# Patient Record
Sex: FEMALE | Race: WHITE | Employment: UNEMPLOYED | RURAL
[De-identification: names, ages, dates, MRNs, and addresses within clinical notes are randomized per-mention and may not be internally consistent; named-entity substitution may affect disease eponyms.]

---

## 2017-02-02 ENCOUNTER — LAB ONLY (OUTPATIENT)
Dept: FAMILY MEDICINE CLINIC | Age: 22
End: 2017-02-02

## 2017-02-02 DIAGNOSIS — Z02.89 EXAMINATION, PHYSICAL, EMPLOYEE: Primary | ICD-10-CM

## 2017-02-02 NOTE — MR AVS SNAPSHOT
Visit Information Date & Time Provider Department Dept. Phone Encounter #  
 2/2/2017  7:45 AM [facility] Allen [id number] Upcoming Health Maintenance Date Due Hepatitis A Peds Age 1-18 (1 of 2 - Standard Series) 12/13/1997 HPV AGE 9Y-26Y (1 of 3 - Female 3 Dose Series) 12/13/2007 DTaP/Tdap/Td series (6 - Tdap) 12/13/2007 INFLUENZA AGE 9 TO ADULT 8/1/2016 Allergies as of 2/2/2017  Review Complete On: 9/4/2014 By: Greg Gunter NP No Known Allergies Current Immunizations  Reviewed on 10/3/2014 Name Date DTaP 8/6/2002, 4/6/1998, 8/4/1997, 6/2/1997, 3/24/1997 Hep B Vaccine 6/2/1997, 1/14/1997, 1996 Hib 4/6/1998, 8/4/1997, 6/2/1997, 3/24/1997 MMR 8/6/2002, 4/6/1998 Poliovirus vaccine 8/6/2002, 8/4/1997, 6/2/1997, 3/4/1997 Varicella Virus Vaccine 1/12/1998 Not reviewed this visit You Were Diagnosed With   
  
 Codes Comments Examination, physical, employee    -  Primary ICD-10-CM: Z02.89 ICD-9-CM: V70.5 Vitals Smoking Status Never Smoker Preferred Pharmacy Pharmacy Name Phone [id number], [address] [phone] Your Updated Medication List  
  
   
This list is accurate as of: 2/2/17  9:32 AM.  Always use your most recent med list.  
  
  
  
  
 Elmer Paris FE 1/20 (28) 1 mg-20 mcg (21)/75 mg (7) Tab Generic drug:  norethindrone-ethinyl estradiol TAKE 1 TABLET BY MOUTH DAILY. OTHER We Performed the Following CBC WITH AUTOMATED DIFF [45176 CPT(R)] COLLECTION VENOUS BLOOD,VENIPUNCTURE [id number] CPT(R)] HEMOGLOBIN A1C WITH EAG [70707 CPT(R)] LIPID PANEL [14594 CPT(R)] Introducing [facility] & HEALTH SERVICES!    
 Ludivina Calabrese introduces [employer] Wellness patient portal. Now you can access parts of your medical record, email your doctor's office, and request medication refills online. 1. In your internet browser, go to [email or web address] 2. Click on the First Time User? Click Here link in the Sign In box. You will see the New Member Sign Up page. 3. Enter your Aframe Access Code exactly as it appears below. You will not need to use this code after youve completed the sign-up process. If you do not sign up before the expiration date, you must request a new code. · Aframe Access Code: [email or web address] Expires: 5/3/2017  9:32 AM 
 
4. Enter the last four digits of your Social Security Number (xxxx) and Date of Birth (mm/dd/yyyy) as indicated and click Submit. You will be taken to the next sign-up page. 5. Create a Aframe ID. This will be your Aframe login ID and cannot be changed, so think of one that is secure and easy to remember. 6. Create a Aframe password. You can change your password at any time. 7. Enter your Password Reset Question and Answer. This can be used at a later time if you forget your password. 8. Enter your e-mail address. You will receive e-mail notification when new information is available in [address] 9. Click Sign Up. You can now view and download portions of your medical record. 10. Click the Download Summary menu link to download a portable copy of your medical information. If you have questions, please visit the Frequently Asked Questions section of the Aframe website. Remember, Aframe is NOT to be used for urgent needs. For medical emergencies, dial 911. Now available from your iPhone and Android! Please provide this summary of care documentation to your next provider. Your primary care clinician is listed as Misa Morgan. If you have any questions after today's visit, please call [phone].

## 2017-02-03 LAB
BASOPHILS # BLD AUTO: 0 X10E3/UL (ref 0–0.2)
BASOPHILS NFR BLD AUTO: 0 %
CHOLEST SERPL-MCNC: 179 MG/DL (ref 100–199)
EOSINOPHIL # BLD AUTO: 0 X10E3/UL (ref 0–0.4)
EOSINOPHIL NFR BLD AUTO: 1 %
ERYTHROCYTE [DISTWIDTH] IN BLOOD BY AUTOMATED COUNT: 12.7 % (ref 12.3–15.4)
EST. AVERAGE GLUCOSE BLD GHB EST-MCNC: 117 MG/DL
HBA1C MFR BLD: 5.7 % (ref 4.8–5.6)
HCT VFR BLD AUTO: 40.6 % (ref 34–46.6)
HDLC SERPL-MCNC: 42 MG/DL
HGB BLD-MCNC: 13.9 G/DL (ref 11.1–15.9)
IMM GRANULOCYTES # BLD: 0 X10E3/UL (ref 0–0.1)
IMM GRANULOCYTES NFR BLD: 0 %
LDLC SERPL CALC-MCNC: 109 MG/DL (ref 0–99)
LYMPHOCYTES # BLD AUTO: 2 X10E3/UL (ref 0.7–3.1)
LYMPHOCYTES NFR BLD AUTO: 32 %
MCH RBC QN AUTO: 31.3 PG (ref 26.6–33)
MCHC RBC AUTO-ENTMCNC: 34.2 G/DL (ref 31.5–35.7)
MCV RBC AUTO: 91 FL (ref 79–97)
MONOCYTES # BLD AUTO: 0.5 X10E3/UL (ref 0.1–0.9)
MONOCYTES NFR BLD AUTO: 9 %
NEUTROPHILS # BLD AUTO: 3.7 X10E3/UL (ref 1.4–7)
NEUTROPHILS NFR BLD AUTO: 58 %
PLATELET # BLD AUTO: 291 X10E3/UL (ref 150–379)
RBC # BLD AUTO: 4.44 X10E6/UL (ref 3.77–5.28)
TRIGL SERPL-MCNC: 141 MG/DL (ref 0–149)
VLDLC SERPL CALC-MCNC: 28 MG/DL (ref 5–40)
WBC # BLD AUTO: 6.4 X10E3/UL (ref 3.4–10.8)

## 2018-03-13 ENCOUNTER — OFFICE VISIT (OUTPATIENT)
Dept: FAMILY MEDICINE CLINIC | Age: 23
End: 2018-03-13

## 2018-03-13 VITALS
DIASTOLIC BLOOD PRESSURE: 90 MMHG | WEIGHT: 201.2 LBS | SYSTOLIC BLOOD PRESSURE: 132 MMHG | BODY MASS INDEX: 33.52 KG/M2 | OXYGEN SATURATION: 99 % | HEART RATE: 90 BPM | RESPIRATION RATE: 20 BRPM | HEIGHT: 65 IN

## 2018-03-13 DIAGNOSIS — F41.1 GENERALIZED ANXIETY DISORDER: ICD-10-CM

## 2018-03-13 DIAGNOSIS — I10 ESSENTIAL HYPERTENSION: Primary | ICD-10-CM

## 2018-03-13 RX ORDER — METOPROLOL SUCCINATE 25 MG/1
25 TABLET, EXTENDED RELEASE ORAL DAILY
Qty: 30 TAB | Refills: 5 | Status: SHIPPED | OUTPATIENT
Start: 2018-03-13 | End: 2018-03-30 | Stop reason: SDUPTHER

## 2018-03-13 NOTE — PROGRESS NOTES
Chief Complaint   Patient presents with    Hypertension    Anxiety         HPI:       is a 25 y.o. female. 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 New Issues:  Her BP has been elevated the last couple times that she has been to the doctors office. Diastolic BP has been consistently in the 90s. Her heart rate is also high. Does not know if it is related to anxiety. She has gained weight, but has been working on changing her diet. No Known Allergies    Current Outpatient Prescriptions   Medication Sig    JUNEL FE 1/20, 28, 1 mg-20 mcg (21)/75 mg (7) tab TAKE 1 TABLET BY MOUTH DAILY. No current facility-administered medications for this visit. No past medical history on file. No past surgical history on file. Social History     Social History    Marital status:      Spouse name: N/A    Number of children: N/A    Years of education: N/A     Social History Main Topics    Smoking status: Never Smoker    Smokeless tobacco: Never Used    Alcohol use No    Drug use: None    Sexual activity: Yes     Partners: Male     Other Topics Concern    None     Social History Narrative       No family history on file. Above history reviewed. ROS:  Denies fever, chills, cough, chest pain, SOB,  nausea, vomiting, or diarrhea. Denies wt loss, wt gain, hemoptysis, hematochezia or melena. Physical Examination:    /90 (BP 1 Location: Right arm, BP Patient Position: Sitting)  Pulse 90  Resp 20  Ht 5' 4.5\" (1.638 m)  Wt 201 lb 3.2 oz (91.3 kg)  SpO2 99%  BMI 34 kg/m2    General: Alert and Ox3, Fluent speech  Neck:  Supple, no adenopathy, JVD, mass or bruit  Chest:  Clear to Ausculation, without wheezes, rales, rubs or ronchi  Cardiac: RRR  Extremities:  No cyanosis, clubbing or edema  Neurologic:  Ambulatory without assist, CN 2-12 grossly intact. Moves all extremities.   Skin: no rash  Lymphadenopathy: no cervical or supraclavicular nodes    ASSESSMENT AND PLAN:     1. Essential hypertension  Starting Metoprolol  May help to lower her heart rate as well  Plan to check labs one morning when she is fasting  - TSH 3RD GENERATION; Future  - METABOLIC PANEL, COMPREHENSIVE; Future  - CBC WITH AUTOMATED DIFF; Future  - metoprolol succinate (TOPROL-XL) 25 mg XL tablet; Take 1 Tab by mouth daily. Indications: hypertension  Dispense: 30 Tab; Refill: 5    2. Generalized anxiety disorder  Plan to start medication next visit  Would consider Effexor  Do not want to start two new medications at one time.        RTC in 2 weeks    Jenny Montano NP

## 2018-03-13 NOTE — PATIENT INSTRUCTIONS

## 2018-03-13 NOTE — MR AVS SNAPSHOT
[address] [facility] [phone] Patient: Demetrice Ashley MRN: [id number] :1995 Visit Information Date & Time Provider Department Dept. Phone Encounter #  
 3/13/2018  3:30 PM Ramon Leach NP Stephanie Bartholomew [id number] Upcoming Health Maintenance Date Due  
 HPV AGE 9Y-34Y (1 of 3 - Female 3 Dose Series) 2007 DTaP/Tdap/Td series (6 - Tdap) 2007 PAP AKA CERVICAL CYTOLOGY 2017 Allergies as of 3/13/2018  Review Complete On: 3/13/2018 By: Ramon Leach NP No Known Allergies Current Immunizations  Reviewed on 10/3/2014 Name Date DTaP 2002, 1998, 1997, 1997, 3/24/1997 Hep B Vaccine 1997, 1997, 1996 Hib 1998, 1997, 1997, 3/24/1997 MMR 2002, 1998 Poliovirus vaccine 2002, 1997, 1997, 3/4/1997 Varicella Virus Vaccine 1998 Not reviewed this visit You Were Diagnosed With   
  
 Codes Comments Essential hypertension    -  Primary ICD-10-CM: I10 
ICD-9-CM: 401.9 Generalized anxiety disorder     ICD-10-CM: F41.1 ICD-9-CM: 300.02 Vitals BP Pulse Resp Height(growth percentile) Weight(growth percentile) SpO2  
 132/90 (BP 1 Location: Right arm, BP Patient Position: Sitting) 90 20 5' 4.5\" (1.638 m) 201 lb 3.2 oz (91.3 kg) 99% BMI Smoking Status 34 kg/m2 Never Smoker Vitals History BMI and BSA Data Body Mass Index Body Surface Area 34 kg/m 2 2.04 m 2 Preferred Pharmacy Pharmacy Name Phone [id number], [address] [place] [address] [place] [phone] Your Updated Medication List  
  
   
This list is accurate as of 3/13/18  3:59 PM.  Always use your most recent med list.  
  
  
  
  
 Laverta Mendoza FE  (28) 1 mg-20 mcg (21)/75 mg (7) Tab Generic drug:  norethindrone-ethinyl estradiol TAKE 1 TABLET BY MOUTH DAILY. metoprolol succinate 25 mg XL tablet Commonly known as:  TOPROL-XL Take 1 Tab by mouth daily. Indications: hypertension Prescriptions Sent to Pharmacy Refills  
 metoprolol succinate (TOPROL-XL) 25 mg XL tablet 5 Sig: Take 1 Tab by mouth daily. Indications: hypertension Class: Normal  
 [id number] Laurine Olszewski, [address]  #: [phone] Route: Oral  
  
To-Do List   
 03/13/2018 Lab:  CBC WITH AUTOMATED DIFF   
  
 03/13/2018 Lab:  METABOLIC PANEL, COMPREHENSIVE   
  
 03/13/2018 Lab:  TSH 3RD GENERATION Introducing [facility] & HEALTH SERVICES! [facility] SYSTEM: Thank you for requesting a IP Fabrics account. Our records indicate that you already have an active IP [employer] account. You can access your account anytime at [email or web address] Did you know that you can access your hospital and ER discharge instructions at any time in IP Fab[employer]? You can also review all of your test results from your hospital stay or ER visit. Additional Information If you have questions, please visit the Frequently Asked Questions section of the IP Fabrics website at [email or web address] Remember, IP Fabrics is NOT to be used for urgent needs. For medical emergencies, dial 911. Now available from your iPhone and Android! Please provide this summary of care documentation to your next provider. Your primary care clinician is listed as Alvenia Saline. If you have any questions after today's visit, please call [phone].

## 2018-03-30 ENCOUNTER — OFFICE VISIT (OUTPATIENT)
Dept: FAMILY MEDICINE CLINIC | Age: 23
End: 2018-03-30

## 2018-03-30 VITALS
HEART RATE: 77 BPM | OXYGEN SATURATION: 98 % | SYSTOLIC BLOOD PRESSURE: 125 MMHG | DIASTOLIC BLOOD PRESSURE: 80 MMHG | WEIGHT: 196.4 LBS | BODY MASS INDEX: 32.72 KG/M2 | RESPIRATION RATE: 16 BRPM | HEIGHT: 65 IN

## 2018-03-30 DIAGNOSIS — I10 ESSENTIAL HYPERTENSION: Primary | ICD-10-CM

## 2018-03-30 DIAGNOSIS — F41.1 GENERALIZED ANXIETY DISORDER: ICD-10-CM

## 2018-03-30 RX ORDER — METOPROLOL SUCCINATE 25 MG/1
25 TABLET, EXTENDED RELEASE ORAL DAILY
Qty: 90 TAB | Refills: 3 | Status: SHIPPED | OUTPATIENT
Start: 2018-03-30 | End: 2019-06-27 | Stop reason: ALTCHOICE

## 2018-03-30 RX ORDER — VENLAFAXINE HYDROCHLORIDE 37.5 MG/1
37.5 CAPSULE, EXTENDED RELEASE ORAL DAILY
Qty: 30 CAP | Refills: 1 | Status: SHIPPED | OUTPATIENT
Start: 2018-03-30 | End: 2018-04-16 | Stop reason: SDUPTHER

## 2018-03-30 NOTE — MR AVS SNAPSHOT
[address] [facility] [phone] Patient: Fahad Bhatia MRN: [id number] :1995 Visit Information Date & Time Provider Department Dept. Phone Encounter #  
 3/30/2018  3:30 PM Delfino Mueller NP [address] [facility] [id number] Follow-up Instructions Return in about 2 weeks (around 2018). Follow-up and Disposition History Upcoming Health Maintenance Date Due  
 HPV AGE 9Y-34Y (1 of 3 - Female 3 Dose Series) 2007 DTaP/Tdap/Td series (6 - Tdap) 2007 PAP AKA CERVICAL CYTOLOGY 2017 Allergies as of 3/30/2018  Review Complete On: 3/30/2018 By: Delfino Mueller NP No Known Allergies Current Immunizations  Reviewed on 10/3/2014 Name Date DTaP 2002, 1998, 1997, 1997, 3/24/1997 Hep B Vaccine 1997, 1997, 1996 Hib 1998, 1997, 1997, 3/24/1997 MMR 2002, 1998 Poliovirus vaccine 2002, 1997, 1997, 3/4/1997 Varicella Virus Vaccine 1998 Not reviewed this visit You Were Diagnosed With   
  
 Codes Comments Essential hypertension    -  Primary ICD-10-CM: I10 
ICD-9-CM: 401.9 Generalized anxiety disorder     ICD-10-CM: F41.1 ICD-9-CM: 300.02 Vitals BP Pulse Resp Height(growth percentile) Weight(growth percentile) SpO2  
 125/80 (BP 1 Location: Left arm, BP Patient Position: Sitting) 77 16 5' 4.5\" (1.638 m) 196 lb 6.4 oz (89.1 kg) 98% BMI Smoking Status 33.19 kg/m2 Never Smoker Vitals History BMI and BSA Data Body Mass Index Body Surface Area  
 33.19 kg/m 2 2.01 m 2 Preferred Pharmacy [facility]acy Name Phone [id number], [address] [phone] Your Updated Medication List  
  
   
This list is accurate as of 3/30/18  4:01 PM.  Always use your most recent med list.  
  
  
  
  
 Brock Corea  1/20 (28) 1 mg-20 mcg (21)/75 mg (7) Tab Generic drug:  norethindrone-ethinyl estradiol TAKE 1 TABLET BY MOUTH DAILY. metoprolol succinate 25 mg XL tablet Commonly known as:  TOPROL-XL Take 1 Tab by mouth daily. Indications: hypertension  
  
 venlafaxine-SR 37.5 mg capsule Commonly known as:  EFFEXOR-XR Take 1 Cap by mouth daily. Indications: Generalized Anxiety Disorder Prescriptions Sent to Pharmacy Refills  
 venlafaxine-SR (EFFEXOR-XR) 37.5 mg capsule 1 Sig: Take 1 Cap by mouth daily. Indications: Generalized Anxiety Disorder Class: Normal  
 Pharmacy: [facility]/pharmacy #[id number] Jose Rosario [facility] [address] [place]  #: [phone] Route: Oral  
 metoprolol succinate (TOPROL-XL) 25 mg XL tablet 3 Sig: Take 1 Tab by mouth daily. Indications: hypertension Class: Normal  
 Pharmacy: [facility]/pharmacy #[id number] [place] [address] [facility] #: [phone] Route: Oral  
  
We Performed the Following COLLECTION VENOUS BLOOD,VENIPUNCTURE I4401020 CPT(R)] Follow-up Instructions Return in about 2 weeks (around 4/13/2018). Patient Instructions Anxiety Disorder: Care Instructions Your Care Instructions Anxiety is a normal reaction to stress. Difficult situations can cause you to have symptoms such as sweaty palms and a nervous feeling. In an anxiety disorder, the symptoms are far more severe. Constant worry, muscle tension, trouble sleeping, nausea and diarrhea, and other symptoms can make normal daily activities difficult or impossible. These symptoms may occur for no reason, and they can affect your work, school, or social life. Medicines, counseling, and self-care can all help. Follow-up care is a key part of your treatment and safety.  Be sure to make and go to all appointments, and call your doctor if you are having problems. It's also a good idea to know your test results and keep a list of the medicines you take. How can you care for yourself at home? · Take medicines exactly as directed. Call your doctor if you think you are having a problem with your medicine. · Go to your counseling sessions and follow-up appointments. · Recognize and accept your anxiety. Then, when you are in a situation that makes you anxious, say to yourself, \"This is not an emergency. I feel uncomfortable, but I am not in danger. I can keep going even if I feel anxious. \" · Be kind to your body: ¨ Relieve tension with exercise or a massage. ¨ Get enough rest. 
¨ Avoid alcohol, caffeine, nicotine, and illegal drugs. They can increase your anxiety level and cause sleep problems. ¨ Learn and do relaxation techniques. See below for more about these techniques. · Engage your mind. Get out and do something you enjoy. Go to a funny movie, or take a walk or hike. Plan your day. Having too much or too little to do can make you anxious. · Keep a record of your symptoms. Discuss your fears with a good friend or family member, or join a support group for people with similar problems. Talking to others sometimes relieves stress. · Get involved in social groups, or volunteer to help others. Being alone sometimes makes things seem worse than they are. · Get at least 30 minutes of exercise on most days of the week to relieve stress. Walking is a good choice. You also may want to do other activities, such as running, swimming, cycling, or playing tennis or team sports. Relaxation techniques Do relaxation exercises 10 to 20 minutes a day. You can play soothing, relaxing music while you do them, if you wish. · Tell others in your house that you are going to do your relaxation exercises. Ask them not to disturb you. · Find a comfortable place, away from all distractions and noise. · Lie down on your back, or sit with your back straight. · Focus on your breathing. Make it slow and steady. · Breathe in through your nose. Breathe out through either your nose or mouth. · Breathe deeply, filling up the area between your navel and your rib cage. Breathe so that your belly goes up and down. · Do not hold your breath. · Breathe like this for 5 to 10 minutes. Notice the feeling of calmness throughout your whole body. As you continue to breathe slowly and deeply, relax by doing the following for another 5 to 10 minutes: · Tighten and relax each muscle group in your body. You can begin at your toes and work your way up to your head. · Imagine your muscle groups relaxing and becoming heavy. · Empty your mind of all thoughts. · Let yourself relax more and more deeply. · Become aware of the state of calmness that surrounds you. · When your relaxation time is over, you can bring yourself back to alertness by moving your fingers and toes and then your hands and feet and then stretching and moving your entire body. Sometimes people fall asleep during relaxation, but they usually wake up shortly afterward. · Always give yourself time to return to full alertness before you drive a car or do anything that might cause an accident if you are not fully alert. Never play a relaxation tape while you drive a car. When should you call for help? Call 911 anytime you think you may need emergency care. For example, call if: 
? · You feel you cannot stop from hurting yourself or someone else. ? Keep the numbers for these national suicide hotlines: 3-814-222-TALK (8-634.390.5366) and 8-247-KYBANUE (8-648.231.4227). If you or someone you know talks about suicide or feeling hopeless, get help right away. ? Watch closely for changes in your health, and be sure to contact your doctor if: 
? · You have anxiety or fear that affects your life. ? · You have symptoms of anxiety that are new or different from those you had before. Where can you learn more? Go to http://james-mushtaq.info/. Enter P754 in the search box to learn more about \"Anxiety Disorder: Care Instructions. \" Current as of: May 12, 2017 Content Version: 11.4 © 8489-8543 [facility]. Care instructions adapted under license by BUYSTAND (which disclaims liability or warranty for this information). If you have questions about a medical condition or this instruction, always ask your healthcare professional. [address] any warranty or liability for your use of this information. If you have any questions regarding UBmatrix, you may call UBmatrix support at [phone]. Patient Instructions History Introducing [facility] & HEALTH SERVICES! Dear Jo Adamson: Thank you for requesting a Novafora account. Our records indicate that you already have an active Novafora account. You can access your account anytime at [email or web address] Did you know that you can access your hospital and ER discharge instructions at any time in Novafora? You can also review all of your test results from your hospital stay or ER visit. Additional Information If you have questions, please visit the Frequently Asked Questions section of the Novafora website at [email or web address] Remember, Novafora is NOT to be used for urgent needs. For medical emergencies, dial 911. Now available from your iPhone and Android! Please provide this summary of care documentation to your next provider. Your primary care clinician is listed as [facility]. If you have any questions after today's visit, please call [phone].

## 2018-03-30 NOTE — PATIENT INSTRUCTIONS
Anxiety Disorder: Care Instructions  Your Care Instructions    Anxiety is a normal reaction to stress. Difficult situations can cause you to have symptoms such as sweaty palms and a nervous feeling. In an anxiety disorder, the symptoms are far more severe. Constant worry, muscle tension, trouble sleeping, nausea and diarrhea, and other symptoms can make normal daily activities difficult or impossible. These symptoms may occur for no reason, and they can affect your work, school, or social life. Medicines, counseling, and self-care can all help. Follow-up care is a key part of your treatment and safety. Be sure to make and go to all appointments, and call your doctor if you are having problems. It's also a good idea to know your test results and keep a list of the medicines you take. How can you care for yourself at home? · Take medicines exactly as directed. Call your doctor if you think you are having a problem with your medicine. · Go to your counseling sessions and follow-up appointments. · Recognize and accept your anxiety. Then, when you are in a situation that makes you anxious, say to yourself, \"This is not an emergency. I feel uncomfortable, but I am not in danger. I can keep going even if I feel anxious. \"  · Be kind to your body:  ¨ Relieve tension with exercise or a massage. ¨ Get enough rest.  ¨ Avoid alcohol, caffeine, nicotine, and illegal drugs. They can increase your anxiety level and cause sleep problems. ¨ Learn and do relaxation techniques. See below for more about these techniques. · Engage your mind. Get out and do something you enjoy. Go to a funny movie, or take a walk or hike. Plan your day. Having too much or too little to do can make you anxious. · Keep a record of your symptoms. Discuss your fears with a good friend or family member, or join a support group for people with similar problems. Talking to others sometimes relieves stress.   · Get involved in social groups, or volunteer to help others. Being alone sometimes makes things seem worse than they are. · Get at least 30 minutes of exercise on most days of the week to relieve stress. Walking is a good choice. You also may want to do other activities, such as running, swimming, cycling, or playing tennis or team sports. Relaxation techniques  Do relaxation exercises 10 to 20 minutes a day. You can play soothing, relaxing music while you do them, if you wish. · Tell others in your house that you are going to do your relaxation exercises. Ask them not to disturb you. · Find a comfortable place, away from all distractions and noise. · Lie down on your back, or sit with your back straight. · Focus on your breathing. Make it slow and steady. · Breathe in through your nose. Breathe out through either your nose or mouth. · Breathe deeply, filling up the area between your navel and your rib cage. Breathe so that your belly goes up and down. · Do not hold your breath. · Breathe like this for 5 to 10 minutes. Notice the feeling of calmness throughout your whole body. As you continue to breathe slowly and deeply, relax by doing the following for another 5 to 10 minutes:  · Tighten and relax each muscle group in your body. You can begin at your toes and work your way up to your head. · Imagine your muscle groups relaxing and becoming heavy. · Empty your mind of all thoughts. · Let yourself relax more and more deeply. · Become aware of the state of calmness that surrounds you. · When your relaxation time is over, you can bring yourself back to alertness by moving your fingers and toes and then your hands and feet and then stretching and moving your entire body. Sometimes people fall asleep during relaxation, but they usually wake up shortly afterward. · Always give yourself time to return to full alertness before you drive a car or do anything that might cause an accident if you are not fully alert.  Never play a relaxation tape while you drive a car. When should you call for help? Call 911 anytime you think you may need emergency care. For example, call if:  ? · You feel you cannot stop from hurting yourself or someone else. ? Keep the numbers for these national suicide hotlines: 8-933-747-TALK (0-826.953.8900) and 0-918-TUMONVL (5-516.570.3153). If you or someone you know talks about suicide or feeling hopeless, get help right away. ? Watch closely for changes in your health, and be sure to contact your doctor if:  ? · You have anxiety or fear that affects your life. ? · You have symptoms of anxiety that are new or different from those you had before. Where can you learn more? Go to http://james-mushtaq.info/. Enter P754 in the search box to learn more about \"Anxiety Disorder: Care Instructions. \"  Current as of: May 12, 2017  Content Version: 11.4  © 0356-4649 eefoof.com. Care instructions adapted under license by Tabber (which disclaims liability or warranty for this information). If you have questions about a medical condition or this instruction, always ask your healthcare professional. [address] any warranty or liability for your use of this information. If you have any questions regarding ClaimKit, you may call ClaimKit support at [phone].

## 2018-03-30 NOTE — PROGRESS NOTES
Chief Complaint   Patient presents with    Anxiety     follow up    Hypertension         HPI:      Micah Mena is a 25 y.o. female. 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 Diastolic BP has been consistently in the 90s. Her heart rate is also high. Added Metoprolol last visit. Denies side effects. New Issues:  Doing well on BP pill. Did not get a chance to come back and get blood drawn. Still feels anxious. Is interested in starting on something. No Known Allergies    Current Outpatient Prescriptions   Medication Sig    metoprolol succinate (TOPROL-XL) 25 mg XL tablet Take 1 Tab by mouth daily. Indications: hypertension    JUNEL FE 1/20, 28, 1 mg-20 mcg (21)/75 mg (7) tab TAKE 1 TABLET BY MOUTH DAILY. No current facility-administered medications for this visit. No past medical history on file. No past surgical history on file. Social History     Social History    Marital status:      Spouse name: N/A    Number of children: N/A    Years of education: N/A     Social History Main Topics    Smoking status: Never Smoker    Smokeless tobacco: Never Used    Alcohol use No    Drug use: None    Sexual activity: Yes     Partners: Male     Other Topics Concern    None     Social History Narrative       No family history on file. Above history reviewed. ROS:  Denies fever, chills, cough, chest pain, SOB,  nausea, vomiting, or diarrhea. Denies wt loss, wt gain, hemoptysis, hematochezia or melena.     Physical Examination:    /80 (BP 1 Location: Left arm, BP Patient Position: Sitting)  Pulse 77  Resp 16  Ht 5' 4.5\" (1.638 m)  Wt 196 lb 6.4 oz (89.1 kg)  SpO2 98%  BMI 33.19 kg/m2    General: Alert and Ox3, Fluent speech  Neck:  Supple, no adenopathy, JVD, mass or bruit  Chest:  Clear to Ausculation, without wheezes, rales, rubs or yazmin  Cardiac: RRR  Extremities:  No cyanosis, clubbing or edema  Neurologic:  Ambulatory without assist, CN 2-12 grossly intact. Moves all extremities. Skin: no rash  Lymphadenopathy: no cervical or supraclavicular nodes    ASSESSMENT AND PLAN:     1. Essential hypertension  Good control  Continue current regimen   Checking labs  - metoprolol succinate (TOPROL-XL) 25 mg XL tablet; Take 1 Tab by mouth daily. Indications: hypertension  Dispense: 90 Tab; Refill: 3    2. Generalized anxiety disorder  Starting Effexor  Will likely need to increase in the future  - venlafaxine-SR (EFFEXOR-XR) 37.5 mg capsule; Take 1 Cap by mouth daily. Indications: Generalized Anxiety Disorder  Dispense: 30 Cap;  Refill: 1     RTC in 2 weeks    Suly Orellana NP

## 2018-03-31 LAB
ALBUMIN SERPL-MCNC: 4.5 G/DL (ref 3.5–5.5)
ALBUMIN/GLOB SERPL: 1.4 {RATIO} (ref 1.2–2.2)
ALP SERPL-CCNC: 61 IU/L (ref 39–117)
ALT SERPL-CCNC: 45 IU/L (ref 0–32)
AST SERPL-CCNC: 21 IU/L (ref 0–40)
BASOPHILS # BLD AUTO: 0 X10E3/UL (ref 0–0.2)
BASOPHILS NFR BLD AUTO: 0 %
BILIRUB SERPL-MCNC: 0.3 MG/DL (ref 0–1.2)
BUN SERPL-MCNC: 12 MG/DL (ref 6–20)
BUN/CREAT SERPL: 17 (ref 9–23)
CALCIUM SERPL-MCNC: 9.2 MG/DL (ref 8.7–10.2)
CHLORIDE SERPL-SCNC: 99 MMOL/L (ref 96–106)
CO2 SERPL-SCNC: 20 MMOL/L (ref 18–29)
CREAT SERPL-MCNC: 0.71 MG/DL (ref 0.57–1)
EOSINOPHIL # BLD AUTO: 0.1 X10E3/UL (ref 0–0.4)
EOSINOPHIL NFR BLD AUTO: 1 %
ERYTHROCYTE [DISTWIDTH] IN BLOOD BY AUTOMATED COUNT: 12.5 % (ref 12.3–15.4)
GFR SERPLBLD CREATININE-BSD FMLA CKD-EPI: 122 ML/MIN/1.73
GFR SERPLBLD CREATININE-BSD FMLA CKD-EPI: 141 ML/MIN/1.73
GLOBULIN SER CALC-MCNC: 3.2 G/DL (ref 1.5–4.5)
GLUCOSE SERPL-MCNC: 62 MG/DL (ref 65–99)
HCT VFR BLD AUTO: 40.2 % (ref 34–46.6)
HGB BLD-MCNC: 13.4 G/DL (ref 11.1–15.9)
IMM GRANULOCYTES # BLD: 0 X10E3/UL (ref 0–0.1)
IMM GRANULOCYTES NFR BLD: 0 %
LYMPHOCYTES # BLD AUTO: 2.7 X10E3/UL (ref 0.7–3.1)
LYMPHOCYTES NFR BLD AUTO: 39 %
MCH RBC QN AUTO: 31.1 PG (ref 26.6–33)
MCHC RBC AUTO-ENTMCNC: 33.3 G/DL (ref 31.5–35.7)
MCV RBC AUTO: 93 FL (ref 79–97)
MONOCYTES # BLD AUTO: 0.6 X10E3/UL (ref 0.1–0.9)
MONOCYTES NFR BLD AUTO: 9 %
NEUTROPHILS # BLD AUTO: 3.5 X10E3/UL (ref 1.4–7)
NEUTROPHILS NFR BLD AUTO: 51 %
PLATELET # BLD AUTO: 268 X10E3/UL (ref 150–379)
POTASSIUM SERPL-SCNC: 3.8 MMOL/L (ref 3.5–5.2)
PROT SERPL-MCNC: 7.7 G/DL (ref 6–8.5)
RBC # BLD AUTO: 4.31 X10E6/UL (ref 3.77–5.28)
SODIUM SERPL-SCNC: 139 MMOL/L (ref 134–144)
TSH SERPL DL<=0.005 MIU/L-ACNC: 2.18 UIU/ML (ref 0.45–4.5)
WBC # BLD AUTO: 6.9 X10E3/UL (ref 3.4–10.8)

## 2018-04-02 NOTE — PROGRESS NOTES
Thyroid level is normal.  Kidneys look good. One of your liver enzymes is slightly elevated. Avoid Tylenol and heavy amounts of alcohol. We will recheck in a few months.   Blood count is normal.

## 2018-04-16 ENCOUNTER — OFFICE VISIT (OUTPATIENT)
Dept: FAMILY MEDICINE CLINIC | Age: 23
End: 2018-04-16

## 2018-04-16 VITALS
SYSTOLIC BLOOD PRESSURE: 120 MMHG | OXYGEN SATURATION: 97 % | HEART RATE: 73 BPM | DIASTOLIC BLOOD PRESSURE: 72 MMHG | RESPIRATION RATE: 18 BRPM | BODY MASS INDEX: 32.39 KG/M2 | WEIGHT: 194.4 LBS | HEIGHT: 65 IN

## 2018-04-16 DIAGNOSIS — F41.1 GENERALIZED ANXIETY DISORDER: Primary | ICD-10-CM

## 2018-04-16 DIAGNOSIS — I10 ESSENTIAL HYPERTENSION: ICD-10-CM

## 2018-04-16 RX ORDER — VENLAFAXINE HYDROCHLORIDE 75 MG/1
75 CAPSULE, EXTENDED RELEASE ORAL DAILY
Qty: 30 CAP | Refills: 2 | Status: SHIPPED | OUTPATIENT
Start: 2018-04-16 | End: 2018-05-15 | Stop reason: SDUPTHER

## 2018-04-16 NOTE — PROGRESS NOTES
Chief Complaint   Patient presents with    Hypertension     2 week follow up         HPI:      Stormy Yu is a 25 y.o. female. 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 Diastolic BP has been consistently in the 90s. Her heart rate is also high. Added Metoprolol last visit. Denies side effects. Anxiety:  Started on Effexor last visit. Tolerating well. New Issues:  She is here for a BP recheck and check on her anti-anxiety medication. She is not sure how much it helping. Does not feel much different. No Known Allergies    Current Outpatient Prescriptions   Medication Sig    venlafaxine-SR (EFFEXOR-XR) 37.5 mg capsule Take 1 Cap by mouth daily. Indications: Generalized Anxiety Disorder    metoprolol succinate (TOPROL-XL) 25 mg XL tablet Take 1 Tab by mouth daily. Indications: hypertension    JUNEL FE 1/20, 28, 1 mg-20 mcg (21)/75 mg (7) tab TAKE 1 TABLET BY MOUTH DAILY. No current facility-administered medications for this visit. No past medical history on file. No past surgical history on file. Social History     Social History    Marital status:      Spouse name: N/A    Number of children: N/A    Years of education: N/A     Social History Main Topics    Smoking status: Never Smoker    Smokeless tobacco: Never Used    Alcohol use No    Drug use: None    Sexual activity: Yes     Partners: Male     Other Topics Concern    None     Social History Narrative       No family history on file. Above history reviewed. ROS:  Denies fever, chills, cough, chest pain, SOB,  nausea, vomiting, or diarrhea. Denies wt loss, wt gain, hemoptysis, hematochezia or melena.     Physical Examination:    /72 (BP 1 Location: Left arm, BP Patient Position: Sitting)  Pulse 73  Resp 18  Ht 5' 4.5\" (1.638 m)  Wt 194 lb 6.4 oz (88.2 kg)  SpO2 97% BMI 32.85 kg/m2    General: Alert and Ox3, Fluent speech  Neck:  Supple, no adenopathy, JVD, mass or bruit  Chest:  Clear to Ausculation, without wheezes, rales, rubs or ronchi  Cardiac: RRR  Extremities:  No cyanosis, clubbing or edema  Neurologic:  Ambulatory without assist, CN 2-12 grossly intact. Moves all extremities. Skin: no rash  Lymphadenopathy: no cervical or supraclavicular nodes    ASSESSMENT AND PLAN:     1. Generalized anxiety disorder  Increasing dose  - venlafaxine-SR (EFFEXOR-XR) 75 mg capsule; Take 1 Cap by mouth daily. Indications: Generalized Anxiety Disorder  Dispense: 30 Cap; Refill: 2    2.  Essential hypertension  Good control  Continue current regimen      RTC in 1 month    Yenny Davies NP

## 2018-04-16 NOTE — PATIENT INSTRUCTIONS
Anxiety Disorder: Care Instructions  Your Care Instructions    Anxiety is a normal reaction to stress. Difficult situations can cause you to have symptoms such as sweaty palms and a nervous feeling. In an anxiety disorder, the symptoms are far more severe. Constant worry, muscle tension, trouble sleeping, nausea and diarrhea, and other symptoms can make normal daily activities difficult or impossible. These symptoms may occur for no reason, and they can affect your work, school, or social life. Medicines, counseling, and self-care can all help. Follow-up care is a key part of your treatment and safety. Be sure to make and go to all appointments, and call your doctor if you are having problems. It's also a good idea to know your test results and keep a list of the medicines you take. How can you care for yourself at home? · Take medicines exactly as directed. Call your doctor if you think you are having a problem with your medicine. · Go to your counseling sessions and follow-up appointments. · Recognize and accept your anxiety. Then, when you are in a situation that makes you anxious, say to yourself, \"This is not an emergency. I feel uncomfortable, but I am not in danger. I can keep going even if I feel anxious. \"  · Be kind to your body:  ¨ Relieve tension with exercise or a massage. ¨ Get enough rest.  ¨ Avoid alcohol, caffeine, nicotine, and illegal drugs. They can increase your anxiety level and cause sleep problems. ¨ Learn and do relaxation techniques. See below for more about these techniques. · Engage your mind. Get out and do something you enjoy. Go to a funny movie, or take a walk or hike. Plan your day. Having too much or too little to do can make you anxious. · Keep a record of your symptoms. Discuss your fears with a good friend or family member, or join a support group for people with similar problems. Talking to others sometimes relieves stress.   · Get involved in social groups, or volunteer to help others. Being alone sometimes makes things seem worse than they are. · Get at least 30 minutes of exercise on most days of the week to relieve stress. Walking is a good choice. You also may want to do other activities, such as running, swimming, cycling, or playing tennis or team sports. Relaxation techniques  Do relaxation exercises 10 to 20 minutes a day. You can play soothing, relaxing music while you do them, if you wish. · Tell others in your house that you are going to do your relaxation exercises. Ask them not to disturb you. · Find a comfortable place, away from all distractions and noise. · Lie down on your back, or sit with your back straight. · Focus on your breathing. Make it slow and steady. · Breathe in through your nose. Breathe out through either your nose or mouth. · Breathe deeply, filling up the area between your navel and your rib cage. Breathe so that your belly goes up and down. · Do not hold your breath. · Breathe like this for 5 to 10 minutes. Notice the feeling of calmness throughout your whole body. As you continue to breathe slowly and deeply, relax by doing the following for another 5 to 10 minutes:  · Tighten and relax each muscle group in your body. You can begin at your toes and work your way up to your head. · Imagine your muscle groups relaxing and becoming heavy. · Empty your mind of all thoughts. · Let yourself relax more and more deeply. · Become aware of the state of calmness that surrounds you. · When your relaxation time is over, you can bring yourself back to alertness by moving your fingers and toes and then your hands and feet and then stretching and moving your entire body. Sometimes people fall asleep during relaxation, but they usually wake up shortly afterward. · Always give yourself time to return to full alertness before you drive a car or do anything that might cause an accident if you are not fully alert.  Never play a relaxation tape while you drive a car. When should you call for help? Call 911 anytime you think you may need emergency care. For example, call if:  ? · You feel you cannot stop from hurting yourself or someone else. ? Keep the numbers for these national suicide hotlines: 6-126-195-TALK (3-145.958.6708) and 9-206-AQEJTAY (8-770.905.3608). If you or someone you know talks about suicide or feeling hopeless, get help right away. ? Watch closely for changes in your health, and be sure to contact your doctor if:  ? · You have anxiety or fear that affects your life. ? · You have symptoms of anxiety that are new or different from those you had before. Where can you learn more? Go to http://james-mushtaq.info/. Enter P754 in the search box to learn more about \"Anxiety Disorder: Care Instructions. \"  Current as of: May 12, 2017  Content Version: 11.4  © 6679-4991 Healthwise, [employer]. Care instructions adapted under license by [employer] (which disclaims liability or warranty for this information). If you have questions about a medical condition or this instruction, always ask your healthcare professional. [address] any warranty or liability for your use of this information.

## 2018-04-16 NOTE — MR AVS SNAPSHOT
[address] [facility] [phone] Patient: Louie Gallardo MRN: [id number] :1995 Visit Information Date & Time Provider Department Dept. Phone Encounter #  
 2018  7:30 AM Isaias Moise NP Stephanie Bartholomew [id number] Upcoming Health Maintenance Date Due  
 HPV Age 9Y-34Y (1 of 1 - Female 3 Dose Series) 2007 DTaP/Tdap/Td series (6 - Tdap) 2007 PAP AKA CERVICAL CYTOLOGY 2017 Allergies as of 2018  Review Complete On: 2018 By: Isaias Moise NP No Known Allergies Current Immunizations  Reviewed on 10/3/2014 Name Date DTaP 2002, 1998, 1997, 1997, 3/24/1997 Hep B Vaccine 1997, 1997, 1996 Hib 1998, 1997, 1997, 3/24/1997 MMR 2002, 1998 Poliovirus vaccine 2002, 1997, 1997, 3/4/1997 Varicella Virus Vaccine 1998 Not reviewed this visit You Were Diagnosed With   
  
 Codes Comments Generalized anxiety disorder    -  Primary ICD-10-CM: F41.1 ICD-9-CM: 300.02 Essential hypertension     ICD-10-CM: I10 
ICD-9-CM: 401.9 Vitals BP Pulse Resp Height(growth percentile) Weight(growth percentile) SpO2  
 120/72 (BP 1 Location: Left arm, BP Patient Position: Sitting) 73 18 5' 4.5\" (1.638 m) 194 lb 6.4 oz (88.2 kg) 97% BMI Smoking Status 32.85 kg/m2 Never Smoker BMI and BSA Data Body Mass Index Body Surface Area  
 32.85 kg/m 2 2 m 2 Preferred Pharmacy Pharmacy Name Phone [id number] [phone] Your Updated Medication List  
  
   
This list is accurate as of 18  7:51 AM.  Always use your most recent med list.  
  
  
  
  
 Verdia Ar FE  (28) 1 mg-20 mcg (21)/75 mg (7) Tab Generic drug:  norethindrone-ethinyl estradiol TAKE 1 TABLET BY MOUTH DAILY. metoprolol succinate 25 mg XL tablet Commonly known as:  TOPROL-XL Take 1 Tab by mouth daily. Indications: hypertension  
  
 venlafaxine-SR 75 mg capsule Commonly known as:  EFFEXOR-XR Take 1 Cap by mouth daily. Indications: Generalized Anxiety Disorder Prescriptions Sent to Pharmacy Refills  
 venlafaxine-SR (EFFEXOR-XR) 75 mg capsule 2 Sig: Take 1 Cap by mouth daily. Indications: Generalized Anxiety Disorder Class: Normal  
 Pharmacy: [facility]/pharmacy #[id number] Mirna Coronel, [address] [place]  #: [phone] Route: Oral  
  
Introducing [facility]! Dear Arslan Dobbins: Thank you for requesting a Domino account. Our records indicate that you already have an active Domino account. You can access your account anytime at [email or web address] Did you know that you can access your hospital and ER discharge instructions at any time in Domino? You can also review all of your test results from your hospital stay or ER visit. Additional Information If you have questions, please visit the Frequently Asked Questions section of the Domino website at [email or web address] Remember, Domino is NOT to be used for urgent needs. For medical emergencies, dial 911. Now available from your iPhone and Android! Please provide this summary of care documentation to your next provider. Your primary care clinician is listed as Donnell Castro. If you have any questions after today's visit, please call [phone].

## 2018-05-15 ENCOUNTER — OFFICE VISIT (OUTPATIENT)
Dept: FAMILY MEDICINE CLINIC | Age: 23
End: 2018-05-15

## 2018-05-15 VITALS
RESPIRATION RATE: 18 BRPM | WEIGHT: 193 LBS | BODY MASS INDEX: 32.15 KG/M2 | SYSTOLIC BLOOD PRESSURE: 120 MMHG | HEART RATE: 77 BPM | DIASTOLIC BLOOD PRESSURE: 82 MMHG | OXYGEN SATURATION: 98 % | HEIGHT: 65 IN

## 2018-05-15 DIAGNOSIS — F41.1 GENERALIZED ANXIETY DISORDER: Primary | ICD-10-CM

## 2018-05-15 DIAGNOSIS — I10 ESSENTIAL HYPERTENSION: ICD-10-CM

## 2018-05-15 RX ORDER — VENLAFAXINE HYDROCHLORIDE 37.5 MG/1
37.5 CAPSULE, EXTENDED RELEASE ORAL DAILY
Qty: 90 CAP | Refills: 1 | Status: SHIPPED | OUTPATIENT
Start: 2018-05-15 | End: 2018-12-04 | Stop reason: SDUPTHER

## 2018-05-15 RX ORDER — VENLAFAXINE HYDROCHLORIDE 75 MG/1
75 CAPSULE, EXTENDED RELEASE ORAL DAILY
Qty: 90 CAP | Refills: 1 | Status: SHIPPED | OUTPATIENT
Start: 2018-05-15 | End: 2018-12-04 | Stop reason: SDUPTHER

## 2018-05-15 NOTE — PROGRESS NOTES
Chief Complaint   Patient presents with    Hypertension     follow up    Anxiety         HPI:      Carrie Crowder is a 25 y.o. female. 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 History of elevated heart rate as well. Denies side effects. Anxiety:  Increased dose of Effexor last visit. Tolerating well. New Issues:  She is here for a check up after increasing her Effexor. Feels better on the higher dose, but still dwells on mistakes she makes. She is very critical of herself and has trouble getting stuck on things that bother her. Wt Readings from Last 3 Encounters:   05/15/18 193 lb (87.5 kg)   04/16/18 194 lb 6.4 oz (88.2 kg)   03/30/18 196 lb 6.4 oz (89.1 kg)        No Known Allergies    Current Outpatient Prescriptions   Medication Sig    venlafaxine-SR (EFFEXOR-XR) 75 mg capsule Take 1 Cap by mouth daily. Indications: Generalized Anxiety Disorder    metoprolol succinate (TOPROL-XL) 25 mg XL tablet Take 1 Tab by mouth daily. Indications: hypertension    JUNEL FE 1/20, 28, 1 mg-20 mcg (21)/75 mg (7) tab TAKE 1 TABLET BY MOUTH DAILY. No current facility-administered medications for this visit. No past medical history on file. No past surgical history on file. Social History     Social History    Marital status:      Spouse name: N/A    Number of children: N/A    Years of education: N/A     Social History Main Topics    Smoking status: Never Smoker    Smokeless tobacco: Never Used    Alcohol use No    Drug use: None    Sexual activity: Yes     Partners: Male     Other Topics Concern    None     Social History Narrative       No family history on file. Above history reviewed. ROS:  Denies fever, chills, cough, chest pain, SOB,  nausea, vomiting, or diarrhea. Denies wt loss, wt gain, hemoptysis, hematochezia or melena.     Physical Examination:    /82 (BP 1 Location: Left arm, BP Patient Position: Sitting)  Pulse 77  Resp 18  Ht 5' 4.5\" (1.638 m)  Wt 193 lb (87.5 kg)  SpO2 98%  BMI 32.62 kg/m2    General: Alert and Ox3, Fluent speech  Neck:  Supple, no adenopathy, JVD, mass or bruit  Chest:  Clear to Ausculation, without wheezes, rales, rubs or ronchi  Cardiac: RRR  Extremities:  No cyanosis, clubbing or edema  Neurologic:  Ambulatory without assist, CN 2-12 grossly intact. Moves all extremities. Skin: no rash  Lymphadenopathy: no cervical or supraclavicular nodes    ASSESSMENT AND PLAN:     1. Generalized anxiety disorder  Increasing dose  - venlafaxine-SR (EFFEXOR-XR) 37.5 mg capsule; Take 1 Cap by mouth daily. To be used with the 75 mg capsule for total dose of 112.5 mg  Dispense: 90 Cap; Refill: 1  - venlafaxine-SR (EFFEXOR-XR) 75 mg capsule; Take 1 Cap by mouth daily. To be used with the 37.5 mg capsule for total dose of 112.5 mg  Indications: Generalized Anxiety Disorder  Dispense: 90 Cap; Refill: 1    2. Essential hypertension  I have reviewed/discussed the above normal BMI with the patient. I have recommended the following interventions: dietary management education, guidance, and counseling, encourage exercise and monitor weight . Marti Solorio      Good control  Continue current regimen      RTC in 6 months    Ann Marie Reece NP

## 2018-05-15 NOTE — MR AVS SNAPSHOT
[address] [place] [address],[address] [address] [phone] Patient: Ted Berg MRN: [id number] :1995 Visit Information Date & Time Provider Department Dept. Phone Encounter #  
 5/15/2018  7:30 AM Kitty Persaud NP Stephanie Bartholomew [id number] Upcoming Health Maintenance Date Due  
 HPV Age 9Y-34Y (1 of 1 - Female 3 Dose Series) 2007 DTaP/Tdap/Td series (6 - Tdap) 2007 Influenza Age 5 to Adult 2018 PAP AKA CERVICAL CYTOLOGY 3/12/2021 Allergies as of 5/15/2018  Review Complete On: 5/15/2018 By: Kitty Persaud NP No Known Allergies Current Immunizations  Reviewed on 10/3/2014 Name Date DTaP 2002, 1998, 1997, 1997, 3/24/1997 Hep B Vaccine 1997, 1997, 1996 Hib 1998, 1997, 1997, 3/24/1997 MMR 2002, 1998 Poliovirus vaccine 2002, 1997, 1997, 3/4/1997 Varicella Virus Vaccine 1998 Not reviewed this visit You Were Diagnosed With   
  
 Codes Comments Generalized anxiety disorder    -  Primary ICD-10-CM: F41.1 ICD-9-CM: 300.02 Essential hypertension     ICD-10-CM: I10 
ICD-9-CM: 401.9 Vitals BP Pulse Resp Height(growth percentile) Weight(growth percentile) SpO2  
 120/82 (BP 1 Location: Left arm, BP Patient Position: Sitting) 77 18 5' 4.5\" (1.638 m) 193 lb (87.5 kg) 98% BMI Smoking Status 32.62 kg/m2 Never Smoker Vitals History BMI and BSA Data Body Mass Index Body Surface Area  
 32.62 kg/m 2 2 m 2 Preferred Pharmacy Pharmacy Name Phone [id number] [address] [place] [phone] Your Updated Medication List  
  
   
This list is accurate as of 5/15/18  7:43 AM.  Always use your most recent med list.  
  
  
  
  
 Hortencia SYKES  (28) 1 mg-20 mcg (21)/75 mg (7) Tab Generic drug:  norethindrone-ethinyl estradiol TAKE 1 TABLET BY MOUTH DAILY. metoprolol succinate 25 mg XL tablet Commonly known as:  TOPROL-XL Take 1 Tab by mouth daily. Indications: hypertension * venlafaxine-SR 37.5 mg capsule Commonly known as:  EFFEXOR-XR Take 1 Cap by mouth daily. To be used with the 75 mg capsule for total dose of 112.5 mg  
  
 * venlafaxine-SR 75 mg capsule Commonly known as:  EFFEXOR-XR Take 1 Cap by mouth daily. To be used with the 37.5 mg capsule for total dose of 112.5 mg  Indications: Generalized Anxiety Disorder * Notice: This list has 2 medication(s) that are the same as other medications prescribed for you. Read the directions carefully, and ask your doctor or other care provider to review them with you. Prescriptions Sent to Pharmacy Refills  
 venlafaxine-SR (EFFEXOR-XR) 37.5 mg capsule 1 Sig: Take 1 Cap by mouth daily. To be used with the 75 mg capsule for total dose of 112.5 mg  
 Class: Normal  
 Pharmacy: [facility]/pharmacy #[id number] [facility], [address] Ph #: [phone] Route: Oral  
 venlafaxine-SR (EFFEXOR-XR) 75 mg capsule 1 Sig: Take 1 Cap by mouth daily. To be used with the 37.5 mg capsule for total dose of 112.5 mg  Indications: Generalized Anxiety Disorder Class: Normal  
 Pharmacy: [facility]/pharmacy #[id number] [place] [facility], [address] Ph #: [phone] Route: Oral  
  
Introducing [facility]! Dear Russel Yap: Thank you for requesting a PhishMe account. Our records indicate that you already have an active PhishMe account. You can access your account anytime at [email or web address] Did you know that you can access your hospital and ER discharge instructions at any time in PhishMe? You can also review all of your test results from your hospital stay or ER visit. Additional Information If you have questions, please visit the Frequently Asked Questions section of the HubHubhart website at [email or web address] Remember, PrestoSports is NOT to be used for urgent needs. For medical emergencies, dial 911. Now available from your iPhone and Android! Please provide this summary of care documentation to your next provider. Your primary care clinician is listed as Mauricio Keyes. If you have any questions after today's visit, please call [phone].

## 2018-08-14 ENCOUNTER — OFFICE VISIT (OUTPATIENT)
Dept: FAMILY MEDICINE CLINIC | Age: 23
End: 2018-08-14

## 2018-08-14 VITALS
HEART RATE: 90 BPM | OXYGEN SATURATION: 99 % | BODY MASS INDEX: 33.42 KG/M2 | RESPIRATION RATE: 17 BRPM | WEIGHT: 200.6 LBS | SYSTOLIC BLOOD PRESSURE: 115 MMHG | DIASTOLIC BLOOD PRESSURE: 70 MMHG | HEIGHT: 65 IN

## 2018-08-14 DIAGNOSIS — F41.1 GENERALIZED ANXIETY DISORDER: Primary | ICD-10-CM

## 2018-08-14 DIAGNOSIS — I10 ESSENTIAL HYPERTENSION: ICD-10-CM

## 2018-08-14 NOTE — PROGRESS NOTES
Chief Complaint   Patient presents with    Medication Evaluation         HPI:      Ellie Justin is a 25 y.o. female. 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 History of elevated heart rate as well. Denies side effects. Anxiety:  Increased dose of Effexor last visit. Tolerating well. New Issues:  Increased Effexor dose last visit. She feels much better. She has been able to grocery shop by herself which she has never been able to do before. No side effects noted from her medications. Wt Readings from Last 3 Encounters:   08/14/18 200 lb 9.6 oz (91 kg)   05/15/18 193 lb (87.5 kg)   04/16/18 194 lb 6.4 oz (88.2 kg)        No Known Allergies    Current Outpatient Prescriptions   Medication Sig    venlafaxine-SR (EFFEXOR-XR) 37.5 mg capsule Take 1 Cap by mouth daily. To be used with the 75 mg capsule for total dose of 112.5 mg    venlafaxine-SR (EFFEXOR-XR) 75 mg capsule Take 1 Cap by mouth daily. To be used with the 37.5 mg capsule for total dose of 112.5 mg  Indications: Generalized Anxiety Disorder    metoprolol succinate (TOPROL-XL) 25 mg XL tablet Take 1 Tab by mouth daily. Indications: hypertension    JUNEL FE 1/20, 28, 1 mg-20 mcg (21)/75 mg (7) tab TAKE 1 TABLET BY MOUTH DAILY. No current facility-administered medications for this visit. No past medical history on file. No past surgical history on file. Social History     Social History    Marital status:      Spouse name: N/A    Number of children: N/A    Years of education: N/A     Social History Main Topics    Smoking status: Never Smoker    Smokeless tobacco: Never Used    Alcohol use No    Drug use: None    Sexual activity: Yes     Partners: Male     Other Topics Concern    None     Social History Narrative       No family history on file. Above history reviewed.       ROS:  Denies fever, chills, cough, chest pain, SOB,  nausea, vomiting, or diarrhea. Denies wt loss, wt gain, hemoptysis, hematochezia or melena. Physical Examination:    /70 (BP 1 Location: Left arm, BP Patient Position: Sitting)  Pulse 90  Resp 17  Ht 5' 4.5\" (1.638 m)  Wt 200 lb 9.6 oz (91 kg)  SpO2 99%  BMI 33.9 kg/m2    General: Alert and Ox3, Fluent speech  Neck:  Supple, no adenopathy, JVD, mass or bruit  Chest:  Clear to Ausculation, without wheezes, rales, rubs or ronchi  Cardiac: RRR  Extremities:  No cyanosis, clubbing or edema  Neurologic:  Ambulatory without assist, CN 2-12 grossly intact. Moves all extremities. Skin: no rash  Lymphadenopathy: no cervical or supraclavicular nodes    ASSESSMENT AND PLAN:     1. Generalized anxiety disorder  Good control  Continue current regimen     2.  Essential hypertension  Good control  Continue current regimen      RTC in 1 year    Alexandro Ramos NP

## 2018-08-14 NOTE — MR AVS SNAPSHOT
[address] [place] [address],5[address] Floor [facility] [phone] Patient: Glen Watkins MRN: [id number] :1995 Visit Information Date & Time Provider Department Dept. Phone Encounter #  
 2018  7:30 AM Jamie Cortes NP [address] [place] [address] [id number] Follow-up Instructions Return in about 1 year (around 2019). Follow-up and Disposition History Upcoming Health Maintenance Date Due  
 HPV Age 9Y-34Y (1 of 1 - Female 3 Dose Series) 2007 DTaP/Tdap/Td series (6 - Tdap) 2007 Influenza Age 5 to Adult 2018 PAP AKA CERVICAL CYTOLOGY 3/12/2021 Allergies as of 2018  Review Complete On: 2018 By: Jamie Cortes NP No Known Allergies Current Immunizations  Reviewed on 10/3/2014 Name Date DTaP 2002, 1998, 1997, 1997, 3/24/1997 Hep B Vaccine 1997, 1997, 1996 Hib 1998, 1997, 1997, 3/24/1997 MMR 2002, 1998 Poliovirus vaccine 2002, 1997, 1997, 3/4/1997 Varicella Virus Vaccine 1998 Not reviewed this visit You Were Diagnosed With   
  
 Codes Comments Generalized anxiety disorder    -  Primary ICD-10-CM: F41.1 ICD-9-CM: 300.02 Essential hypertension     ICD-10-CM: I10 
ICD-9-CM: 401.9 Vitals BP Pulse Resp Height(growth percentile) Weight(growth percentile) SpO2  
 115/70 (BP 1 Location: Left arm, BP Patient Position: Sitting) 90 17 5' 4.5\" (1.638 m) 200 lb 9.6 oz (91 kg) 99% BMI Smoking Status 33.9 kg/m2 Never Smoker Vitals History BMI and BSA Data Body Mass Index Body Surface Area 33.9 kg/m 2 2.04 m 2 Preferred Pharmacy Pharmacy Name Phone [id number], [address] [phone] Your Updated Medication List  
  
   
 This list is accurate as of 8/14/18  8:08 AM.  Always use your most recent med list.  
  
  
  
  
 Kari Pitts FE 1/20 (28) 1 mg-20 mcg (21)/75 mg (7) Tab Generic drug:  norethindrone-ethinyl estradiol TAKE 1 TABLET BY MOUTH DAILY. metoprolol succinate 25 mg XL tablet Commonly known as:  TOPROL-XL Take 1 Tab by mouth daily. Indications: hypertension * venlafaxine-SR 37.5 mg capsule Commonly known as:  EFFEXOR-XR Take 1 Cap by mouth daily. To be used with the 75 mg capsule for total dose of 112.5 mg  
  
 * venlafaxine-SR 75 mg capsule Commonly known as:  EFFEXOR-XR Take 1 Cap by mouth daily. To be used with the 37.5 mg capsule for total dose of 112.5 mg  Indications: Generalized Anxiety Disorder * Notice: This list has 2 medication(s) that are the same as other medications prescribed for you. Read the directions carefully, and ask your doctor or other care provider to review them with you. Follow-up Instructions Return in about 1 year (around 8/14/2019). Introducing [facility]! Dear Kayla Och: Thank you for requesting a Ask[employer]share account. Our records indicate that you already have an active AskNshare account. You can access your account anytime at [email or web address] Did you know that you can access your hospital and ER discharge instructions at any time in AskNshare? You can also review all of your test results from your hospital stay or ER visit. Additional Information If you have questions, please visit the Frequently Asked Questions section of the AskNshare website at [email or web address] Remember, AskNshare is NOT to be used for urgent needs. For medical emergencies, dial 911. Now available from your iPhone and Android! Please provide this summary of care documentation to your next provider. Your primary care clinician is listed as Jamie Cortes.  If you have any questions after today's visit, please call [phone].

## 2018-10-01 LAB
HBSAG, EXTERNAL: NEGATIVE
HIV, EXTERNAL: NON REACTIVE
RPR, EXTERNAL: NON REACTIVE
RUBELLA, EXTERNAL: NORMAL
T. PALLIDUM, EXTERNAL: NEGATIVE
TYPE, ABO & RH, EXTERNAL: NORMAL

## 2018-10-17 LAB
CHLAMYDIA, EXTERNAL: NEGATIVE
N. GONORRHEA, EXTERNAL: NEGATIVE

## 2018-12-04 DIAGNOSIS — F41.1 GENERALIZED ANXIETY DISORDER: ICD-10-CM

## 2018-12-04 RX ORDER — VENLAFAXINE HYDROCHLORIDE 75 MG/1
75 CAPSULE, EXTENDED RELEASE ORAL DAILY
Qty: 90 CAP | Refills: 1 | Status: SHIPPED | OUTPATIENT
Start: 2018-12-04 | End: 2019-03-05 | Stop reason: SDUPTHER

## 2018-12-04 RX ORDER — VENLAFAXINE HYDROCHLORIDE 37.5 MG/1
37.5 CAPSULE, EXTENDED RELEASE ORAL DAILY
Qty: 90 CAP | Refills: 1 | Status: SHIPPED | OUTPATIENT
Start: 2018-12-04 | End: 2019-03-05 | Stop reason: SDUPTHER

## 2019-03-01 LAB
ANTIBODY SCREEN, EXTERNAL: NEGATIVE
T. PALLIDUM, EXTERNAL: NEGATIVE

## 2019-03-05 DIAGNOSIS — F41.1 GENERALIZED ANXIETY DISORDER: ICD-10-CM

## 2019-03-05 RX ORDER — VENLAFAXINE HYDROCHLORIDE 37.5 MG/1
37.5 CAPSULE, EXTENDED RELEASE ORAL DAILY
Qty: 90 CAP | Refills: 1 | Status: SHIPPED | OUTPATIENT
Start: 2019-03-05 | End: 2019-11-28 | Stop reason: SDUPTHER

## 2019-03-05 RX ORDER — VENLAFAXINE HYDROCHLORIDE 75 MG/1
75 CAPSULE, EXTENDED RELEASE ORAL DAILY
Qty: 90 CAP | Refills: 1 | Status: SHIPPED | OUTPATIENT
Start: 2019-03-05 | End: 2019-12-23

## 2019-04-26 LAB — GRBS, EXTERNAL: POSITIVE

## 2019-05-15 ENCOUNTER — HOSPITAL ENCOUNTER (INPATIENT)
Age: 24
LOS: 3 days | Discharge: HOME | End: 2019-05-18
Attending: OBSTETRICS & GYNECOLOGY | Admitting: OBSTETRICS & GYNECOLOGY
Payer: COMMERCIAL

## 2019-05-15 ENCOUNTER — ANESTHESIA (OUTPATIENT)
Dept: LABOR AND DELIVERY | Age: 24
End: 2019-05-15
Payer: COMMERCIAL

## 2019-05-15 ENCOUNTER — ANESTHESIA EVENT (OUTPATIENT)
Dept: LABOR AND DELIVERY | Age: 24
End: 2019-05-15
Payer: COMMERCIAL

## 2019-05-15 PROBLEM — O10.913 CHRONIC HYPERTENSION WITH EXACERBATION DURING PREGNANCY IN THIRD TRIMESTER: Status: ACTIVE | Noted: 2019-05-15

## 2019-05-15 LAB
ERYTHROCYTE [DISTWIDTH] IN BLOOD BY AUTOMATED COUNT: 13.1 % (ref 11.5–14.5)
HCT VFR BLD AUTO: 36 % (ref 35–47)
HGB BLD-MCNC: 12 G/DL (ref 11.5–16)
MCH RBC QN AUTO: 30.1 PG (ref 26–34)
MCHC RBC AUTO-ENTMCNC: 33.3 G/DL (ref 30–36.5)
MCV RBC AUTO: 90.2 FL (ref 80–99)
NRBC # BLD: 0 K/UL (ref 0–0.01)
NRBC BLD-RTO: 0 PER 100 WBC
PLATELET # BLD AUTO: 274 K/UL (ref 150–400)
PMV BLD AUTO: 11.5 FL (ref 8.9–12.9)
RBC # BLD AUTO: 3.99 M/UL (ref 3.8–5.2)
WBC # BLD AUTO: 7.9 K/UL (ref 3.6–11)

## 2019-05-15 PROCEDURE — 77030034850

## 2019-05-15 PROCEDURE — 74011250636 HC RX REV CODE- 250/636

## 2019-05-15 PROCEDURE — 36415 COLL VENOUS BLD VENIPUNCTURE: CPT

## 2019-05-15 PROCEDURE — 85027 COMPLETE CBC AUTOMATED: CPT

## 2019-05-15 PROCEDURE — 3E0R3BZ INTRODUCTION OF ANESTHETIC AGENT INTO SPINAL CANAL, PERCUTANEOUS APPROACH: ICD-10-PCS | Performed by: ANESTHESIOLOGY

## 2019-05-15 PROCEDURE — 3E033VJ INTRODUCTION OF OTHER HORMONE INTO PERIPHERAL VEIN, PERCUTANEOUS APPROACH: ICD-10-PCS | Performed by: OBSTETRICS & GYNECOLOGY

## 2019-05-15 PROCEDURE — 74011000250 HC RX REV CODE- 250: Performed by: ANESTHESIOLOGY

## 2019-05-15 PROCEDURE — 74011250636 HC RX REV CODE- 250/636: Performed by: OBSTETRICS & GYNECOLOGY

## 2019-05-15 PROCEDURE — 10907ZC DRAINAGE OF AMNIOTIC FLUID, THERAPEUTIC FROM PRODUCTS OF CONCEPTION, VIA NATURAL OR ARTIFICIAL OPENING: ICD-10-PCS | Performed by: OBSTETRICS & GYNECOLOGY

## 2019-05-15 PROCEDURE — 74011000258 HC RX REV CODE- 258: Performed by: OBSTETRICS & GYNECOLOGY

## 2019-05-15 PROCEDURE — 75410000002 HC LABOR FEE PER 1 HR

## 2019-05-15 PROCEDURE — A4300 CATH IMPL VASC ACCESS PORTAL: HCPCS

## 2019-05-15 PROCEDURE — 77030014125 HC TY EPDRL BBMI -B: Performed by: ANESTHESIOLOGY

## 2019-05-15 PROCEDURE — 00HU33Z INSERTION OF INFUSION DEVICE INTO SPINAL CANAL, PERCUTANEOUS APPROACH: ICD-10-PCS | Performed by: ANESTHESIOLOGY

## 2019-05-15 PROCEDURE — 65410000002 HC RM PRIVATE OB

## 2019-05-15 PROCEDURE — 74011000250 HC RX REV CODE- 250

## 2019-05-15 RX ORDER — LABETALOL 100 MG/1
100 TABLET, FILM COATED ORAL DAILY
COMMUNITY
End: 2019-06-27 | Stop reason: ALTCHOICE

## 2019-05-15 RX ORDER — BUPIVACAINE HYDROCHLORIDE AND EPINEPHRINE 2.5; 5 MG/ML; UG/ML
INJECTION, SOLUTION EPIDURAL; INFILTRATION; INTRACAUDAL; PERINEURAL AS NEEDED
Status: DISCONTINUED | OUTPATIENT
Start: 2019-05-15 | End: 2019-05-16 | Stop reason: HOSPADM

## 2019-05-15 RX ORDER — SODIUM CHLORIDE 0.9 % (FLUSH) 0.9 %
5-40 SYRINGE (ML) INJECTION EVERY 8 HOURS
Status: DISCONTINUED | OUTPATIENT
Start: 2019-05-15 | End: 2019-05-16 | Stop reason: HOSPADM

## 2019-05-15 RX ORDER — LABETALOL 100 MG/1
100 TABLET, FILM COATED ORAL DAILY
Status: DISCONTINUED | OUTPATIENT
Start: 2019-05-16 | End: 2019-05-18 | Stop reason: HOSPADM

## 2019-05-15 RX ORDER — BUPIVACAINE HYDROCHLORIDE AND EPINEPHRINE 2.5; 5 MG/ML; UG/ML
INJECTION, SOLUTION EPIDURAL; INFILTRATION; INTRACAUDAL; PERINEURAL
Status: COMPLETED
Start: 2019-05-15 | End: 2019-05-15

## 2019-05-15 RX ORDER — FENTANYL CITRATE 50 UG/ML
INJECTION, SOLUTION INTRAMUSCULAR; INTRAVENOUS AS NEEDED
Status: DISCONTINUED | OUTPATIENT
Start: 2019-05-15 | End: 2019-05-16 | Stop reason: HOSPADM

## 2019-05-15 RX ORDER — SODIUM CHLORIDE 0.9 % (FLUSH) 0.9 %
5-40 SYRINGE (ML) INJECTION AS NEEDED
Status: DISCONTINUED | OUTPATIENT
Start: 2019-05-15 | End: 2019-05-16

## 2019-05-15 RX ORDER — VENLAFAXINE HYDROCHLORIDE 37.5 MG/1
37.5 CAPSULE, EXTENDED RELEASE ORAL DAILY
Status: DISCONTINUED | OUTPATIENT
Start: 2019-05-16 | End: 2019-05-18 | Stop reason: HOSPADM

## 2019-05-15 RX ORDER — LIDOCAINE HYDROCHLORIDE 10 MG/ML
INJECTION INFILTRATION; PERINEURAL AS NEEDED
Status: DISCONTINUED | OUTPATIENT
Start: 2019-05-15 | End: 2019-05-16 | Stop reason: HOSPADM

## 2019-05-15 RX ORDER — GUAIFENESIN 100 MG/5ML
81 LIQUID (ML) ORAL DAILY
COMMUNITY
End: 2019-06-27 | Stop reason: ALTCHOICE

## 2019-05-15 RX ORDER — FENTANYL/BUPIVACAINE/NS/PF 2-1250MCG
1-16 PREFILLED PUMP RESERVOIR EPIDURAL CONTINUOUS
Status: DISCONTINUED | OUTPATIENT
Start: 2019-05-15 | End: 2019-05-16

## 2019-05-15 RX ORDER — EPHEDRINE SULFATE/0.9% NACL/PF 50 MG/5 ML
10 SYRINGE (ML) INTRAVENOUS
Status: DISCONTINUED | OUTPATIENT
Start: 2019-05-15 | End: 2019-05-16

## 2019-05-15 RX ORDER — SODIUM CHLORIDE, SODIUM LACTATE, POTASSIUM CHLORIDE, CALCIUM CHLORIDE 600; 310; 30; 20 MG/100ML; MG/100ML; MG/100ML; MG/100ML
125 INJECTION, SOLUTION INTRAVENOUS CONTINUOUS
Status: DISCONTINUED | OUTPATIENT
Start: 2019-05-15 | End: 2019-05-16

## 2019-05-15 RX ORDER — ACETAMINOPHEN 325 MG/1
650 TABLET ORAL
Status: DISCONTINUED | OUTPATIENT
Start: 2019-05-15 | End: 2019-05-16

## 2019-05-15 RX ORDER — VENLAFAXINE HYDROCHLORIDE 37.5 MG/1
37.5 CAPSULE, EXTENDED RELEASE ORAL 3 TIMES DAILY
COMMUNITY
End: 2019-05-18

## 2019-05-15 RX ORDER — OXYTOCIN/0.9 % SODIUM CHLORIDE 30/500 ML
0-25 PLASTIC BAG, INJECTION (ML) INTRAVENOUS
Status: DISCONTINUED | OUTPATIENT
Start: 2019-05-15 | End: 2019-05-16

## 2019-05-15 RX ORDER — VENLAFAXINE HYDROCHLORIDE 37.5 MG/1
75 CAPSULE, EXTENDED RELEASE ORAL DAILY
Status: DISCONTINUED | OUTPATIENT
Start: 2019-05-16 | End: 2019-05-18 | Stop reason: HOSPADM

## 2019-05-15 RX ORDER — FENTANYL CITRATE 50 UG/ML
INJECTION, SOLUTION INTRAMUSCULAR; INTRAVENOUS
Status: COMPLETED
Start: 2019-05-15 | End: 2019-05-15

## 2019-05-15 RX ADMIN — LIDOCAINE HYDROCHLORIDE 3 ML: 10 INJECTION INFILTRATION; PERINEURAL at 19:06

## 2019-05-15 RX ADMIN — PENICILLIN G POTASSIUM 2.5 MILLION UNITS: 20000000 POWDER, FOR SOLUTION INTRAVENOUS at 21:02

## 2019-05-15 RX ADMIN — SODIUM CHLORIDE, SODIUM LACTATE, POTASSIUM CHLORIDE, AND CALCIUM CHLORIDE 999 ML/HR: 600; 310; 30; 20 INJECTION, SOLUTION INTRAVENOUS at 18:39

## 2019-05-15 RX ADMIN — SODIUM CHLORIDE, SODIUM LACTATE, POTASSIUM CHLORIDE, AND CALCIUM CHLORIDE 125 ML/HR: 600; 310; 30; 20 INJECTION, SOLUTION INTRAVENOUS at 13:00

## 2019-05-15 RX ADMIN — BUPIVACAINE HYDROCHLORIDE AND EPINEPHRINE 3 ML: 2.5; 5 INJECTION, SOLUTION EPIDURAL; INFILTRATION; INTRACAUDAL; PERINEURAL at 19:12

## 2019-05-15 RX ADMIN — PENICILLIN G POTASSIUM 2.5 MILLION UNITS: 20000000 POWDER, FOR SOLUTION INTRAVENOUS at 17:05

## 2019-05-15 RX ADMIN — BUPIVACAINE HYDROCHLORIDE AND EPINEPHRINE 3 ML: 2.5; 5 INJECTION, SOLUTION EPIDURAL; INFILTRATION; INTRACAUDAL; PERINEURAL at 19:14

## 2019-05-15 RX ADMIN — SODIUM CHLORIDE 5 MILLION UNITS: 900 INJECTION, SOLUTION INTRAVENOUS at 13:25

## 2019-05-15 RX ADMIN — Medication 12 ML/HR: at 19:22

## 2019-05-15 RX ADMIN — OXYTOCIN-SODIUM CHLORIDE 0.9% IV SOLN 30 UNIT/500ML 1 MILLI-UNITS/MIN: 30-0.9/5 SOLUTION at 13:24

## 2019-05-15 RX ADMIN — BUPIVACAINE HYDROCHLORIDE AND EPINEPHRINE 5 ML: 2.5; 5 INJECTION, SOLUTION EPIDURAL; INFILTRATION; INTRACAUDAL; PERINEURAL at 19:13

## 2019-05-15 RX ADMIN — FENTANYL CITRATE 100 MCG: 50 INJECTION, SOLUTION INTRAMUSCULAR; INTRAVENOUS at 19:16

## 2019-05-15 RX ADMIN — SODIUM CHLORIDE, SODIUM LACTATE, POTASSIUM CHLORIDE, AND CALCIUM CHLORIDE 125 ML/HR: 600; 310; 30; 20 INJECTION, SOLUTION INTRAVENOUS at 19:22

## 2019-05-15 NOTE — ROUTINE PROCESS
@2183 Verbal bedside report given to Charlotte Quinteros RN on Opal Hidalgo in  room 3611. Report consisted of patients Situation, Background, Assessment and Recommendations(SBAR). Information from the following report(s) SBAR, Kardex, Intake/Output, MAR and Recent Results were reviewed with the receiving nurse. Opportunity for questions and clarification was provided,care assumed.

## 2019-05-15 NOTE — ANESTHESIA PREPROCEDURE EVALUATION
Relevant Problems No relevant active problems Anesthetic History No history of anesthetic complications Review of Systems / Medical History Patient summary reviewed, nursing notes reviewed and pertinent labs reviewed Pulmonary Within defined limits Neuro/Psych Within defined limits Psychiatric history Comments: Anxiety/depression Cardiovascular Within defined limits Hypertension: well controlled Exercise tolerance: >4 METS 
  
GI/Hepatic/Renal 
Within defined limits Endo/Other Within defined limits Obesity Other Findings Physical Exam 
 
Airway Mallampati: III 
TM Distance: 4 - 6 cm Neck ROM: normal range of motion Mouth opening: Normal 
 
 Cardiovascular Rhythm: regular Rate: normal 
 
 
 
 Dental 
 
Dentition: Upper dentition intact and Lower dentition intact Pulmonary Breath sounds clear to auscultation Abdominal 
GI exam deferred Other Findings Anesthetic Plan ASA: 2 Anesthesia type: epidural 
 
 
Post-op pain plan if not by surgeon: indwelling epidural catheter Anesthetic plan and risks discussed with: Patient and Spouse

## 2019-05-15 NOTE — ANESTHESIA PROCEDURE NOTES
Epidural Block    Start time: 5/15/2019 5:02 PM  End time: 5/15/2019 5:18 PM  Performed by: Zee Fraire MD  Authorized by: Zee Fraire MD     Pre-Procedure  Indication: labor epidural    Preanesthetic Checklist: risks and benefits discussed, site marked and timeout performed    Timeout Time: 19:04        Epidural:   Patient position:  Seated  Prep region:  Lumbar  Prep: Chlorhexidine    Location:  L3-4    Needle and Epidural Catheter:   Needle Type:  Tuohy  Needle Gauge:  17 G  Injection Technique:  Loss of resistance using air  Attempts:  1  Catheter Size:  19 G  Depth in Epidural Space (cm):  5  Events: no blood with aspiration, no cerebrospinal fluid with aspiration, no paresthesia and negative aspiration test    Test Dose:  Negative and bupivacaine 0.25% w/ epi    Assessment:   Catheter Secured:  Tegaderm and tape  Insertion:  Uncomplicated  Patient tolerance:  Patient tolerated the procedure well with no immediate complications

## 2019-05-15 NOTE — PROGRESS NOTES
Late Entry:   @ 468 36 152 pt ambulatory with her mother and admit to room [phone] for induction. EFM applied x 2 FHT's 140, + fetal movement, BOWI. 20 g IV started in her right hand,Blood obtained for labs as ordered. @5898 Pitocin started. @1445 OOB to the bathroom     @ 1448 return to bed. Void 100 ml      @ 1801 Dr Kezia Becker in to  Assess the patient. AROM clear fluid with cx exam 5/80/-3     @ 1824 Tachysystole Pitocin reduced by half     @1848 Pitocin off Dr Kezia Becker notified. Anesthesia called for epidural     @1900 Dr Silverio Alvarez in for epidural, pt sitting up on the side of the bed, positioned.      @ 1902 Time out for the epidural.       @1916 lying down S/P epidural.

## 2019-05-15 NOTE — PROGRESS NOTES
4525- report received from ruben Jenkins at bedside finishing epidural.    1940 - landa inserted. 56 - dr. Jorge A Pop at bedside. 1949 - sve by dr Cassidy Valera placed. 2103 - IV penicillin hung per order, patient's BP lower than normal, patient states she is asymptomatic.    2300 - report given to vianca Alvarez rn. Care turned over at this time.

## 2019-05-15 NOTE — H&P
Labor and Delivery Admission Note    CC: sent from office for induction    5/15/2019    26 y.o., , female, G1 P 0 @ 38 3/7 wks with Estimated Date of Delivery: 5/26/19 by dates and US presents at 26 from office for IOL. Patient was seen today by Dr. Emely Olmos and has a h/o CHTN on labetalol 100 mg BID with normal BPs and was noted to have reassuring but not reactive fetal tracing today with absent accels and one small variable decel. Due to favorable cervix and CHTN decision made to induce. Patient's history is also notable for anxiety/depression. Patient is without complaints of HAs, visual changes, RUQ/epigastric pain, SOB, CP, swelling, contractions, LOF, VB or decreased FM. EFW 5/2/19 7#4oz 3296g 82%tile    PNC: Blood type: O            RH: pos            Ab screen: negative            HBsAg: negative            DM Screen: 110            TPA: negative            HIV: negative            GC/Chlamydia: n/a            Rubella: immune            SVII serology: no history given             GBS status: positive    Past Medical History:   Diagnosis Date    Essential hypertension     Psychiatric problem     Anxietydepression     No past surgical history on file. OB/GYN: G1 current  Meds:   Current Facility-Administered Medications   Medication Dose Route Frequency    oxytocin (PITOCIN) 30 units/500 ml NS  0-25 reece-units/min IntraVENous TITRATE    penicillin G potassium (PFIZERPEN) 5 Million Units in 0.9% sodium chloride (MBP/ADV) 100 mL  5 Million Units IntraVENous ONCE    penicillin G pot (PFIZERPEN) 2.5 Million Units in 50 ml 0.9% NaCl  2.5 Million Units IntraVENous Q4H    acetaminophen (TYLENOL) tablet 650 mg  650 mg Oral Q4H PRN     Allergies: No Known Allergies  Pertinent ROS: as per HPI o/w negative   No family history on file.   Social History     Socioeconomic History    Marital status:      Spouse name: Not on file    Number of children: Not on file    Years of education: Not on file    Highest education level: Not on file   Occupational History    Not on file   Social Needs    Financial resource strain: Not on file    Food insecurity:     Worry: Not on file     Inability: Not on file    Transportation needs:     Medical: Not on file     Non-medical: Not on file   Tobacco Use    Smoking status: Never Smoker    Smokeless tobacco: Never Used   Substance and Sexual Activity    Alcohol use: No    Drug use: Not on file    Sexual activity: Yes     Partners: Male   Lifestyle    Physical activity:     Days per week: Not on file     Minutes per session: Not on file    Stress: Not on file   Relationships    Social connections:     Talks on phone: Not on file     Gets together: Not on file     Attends [employer] service: Not on file     Active member of club or organization: Not on file     Attends meetings of clubs or organizations: Not on file     Relationship status: Not on file    Intimate partner violence:     Fear of current or ex partner: Not on file     Emotionally abused: Not on file     Physically abused: Not on file     Forced sexual activity: Not on file   Other Topics Concern     Service Not Asked    Blood Transfusions Not Asked    Caffeine Concern Not Asked    Occupational Exposure Not Asked   Juliet Salinas Hazards Not Asked    Sleep Concern Not Asked    Stress Concern Not Asked    Weight Concern Not Asked    Special Diet Not Asked    Back Care Not Asked    Exercise Not Asked    Bike Helmet Not Asked   [address] [facility] [facility] Not Asked    Self-Exams Not Asked   Social History Narrative    Not on file       OBJECTIVE:  Gravid female NAD  No data recorded. There were no vitals taken for this visit.     Labs:    Lab Results   Component Value Date/Time    WBC 6.9 03/30/2018 04:01 PM       Exam:  HEENT:  normal   Lungs:  Normal respiratory effort  Cor:  Well perfused  Abdomen:  Gravid, EFW 8#  Fetal heart rate tracing:  Baseline 130, moderate variability, accels present, decels absent  Contraction pattern: none  Cervix:  3/50/high/VTX (per Dr. Wendy Prather in office, not rechecked by me)  Fluid:  intact      Impression:  IUP at 38 3/7 weeks with [facility], reassuring but not reactive tracing, favorable cervix, GBS positive.     Plan: Pitocin induction of labor           AROM when feasible after 2nd dose of antibiotics           PCN G for GBS status           CEFM/TOCO           Anticipate vaginal delivery     Jaime Hidalgo MD

## 2019-05-15 NOTE — PROGRESS NOTES
Labor Progress Note  Patient seen, fetal heart rate and contraction pattern evaluated. Patient feeling a little crampy with pitocin now but overall is comfortable. Now s/p 2 doses of antibiotics. Physical Exam:  Cervical Exam: 5/60/-3/well applied  Membranes:  AROM, moderate amount of clear fluid  Uterine Activity: Frequency: q 1-2 minutes  Fetal Heart Rate: baseline 140, moderate variability, accels present, decels absent  Pit @ 15    Assessment/Plan:  Reassuring fetal status. Continue with pitocin, may need to titrate down if wesley too frequently. Epidural whenever pt desires. Continue PCN G for GBS status.     Sydnie Acevedo MD

## 2019-05-16 PROCEDURE — 75410000003 HC RECOV DEL/VAG/CSECN EA 0.5 HR

## 2019-05-16 PROCEDURE — 75410000002 HC LABOR FEE PER 1 HR

## 2019-05-16 PROCEDURE — 76060000078 HC EPIDURAL ANESTHESIA

## 2019-05-16 PROCEDURE — 77010026064 HC OXYGEN INFANT MED AIR MIN

## 2019-05-16 PROCEDURE — 77030002888 HC SUT CHRMC J&J -A

## 2019-05-16 PROCEDURE — 74011000250 HC RX REV CODE- 250: Performed by: ANESTHESIOLOGY

## 2019-05-16 PROCEDURE — 74011250636 HC RX REV CODE- 250/636: Performed by: OBSTETRICS & GYNECOLOGY

## 2019-05-16 PROCEDURE — 75410000000 HC DELIVERY VAGINAL/SINGLE

## 2019-05-16 PROCEDURE — 65410000002 HC RM PRIVATE OB

## 2019-05-16 PROCEDURE — 74011250637 HC RX REV CODE- 250/637: Performed by: OBSTETRICS & GYNECOLOGY

## 2019-05-16 PROCEDURE — 76815 OB US LIMITED FETUS(S): CPT

## 2019-05-16 PROCEDURE — 74011250636 HC RX REV CODE- 250/636

## 2019-05-16 PROCEDURE — 0KQM0ZZ REPAIR PERINEUM MUSCLE, OPEN APPROACH: ICD-10-PCS | Performed by: OBSTETRICS & GYNECOLOGY

## 2019-05-16 PROCEDURE — 77030010848 HC CATH INTUTR PRSS KOLB -B

## 2019-05-16 PROCEDURE — 77030005537 HC CATH URETH BARD -A

## 2019-05-16 RX ORDER — HYDROCORTISONE ACETATE PRAMOXINE HCL 2.5; 1 G/100G; G/100G
CREAM TOPICAL AS NEEDED
Status: DISCONTINUED | OUTPATIENT
Start: 2019-05-16 | End: 2019-05-18 | Stop reason: HOSPADM

## 2019-05-16 RX ORDER — OXYTOCIN/RINGER'S LACTATE 20/1000 ML
125-500 PLASTIC BAG, INJECTION (ML) INTRAVENOUS ONCE
Status: ACTIVE | OUTPATIENT
Start: 2019-05-16 | End: 2019-05-17

## 2019-05-16 RX ORDER — DIPHENHYDRAMINE HCL 25 MG
25 CAPSULE ORAL
Status: DISCONTINUED | OUTPATIENT
Start: 2019-05-16 | End: 2019-05-18 | Stop reason: HOSPADM

## 2019-05-16 RX ORDER — OXYCODONE AND ACETAMINOPHEN 5; 325 MG/1; MG/1
1 TABLET ORAL
Status: DISCONTINUED | OUTPATIENT
Start: 2019-05-16 | End: 2019-05-18 | Stop reason: HOSPADM

## 2019-05-16 RX ORDER — PROMETHAZINE HYDROCHLORIDE 25 MG/1
25 TABLET ORAL
Status: DISCONTINUED | OUTPATIENT
Start: 2019-05-16 | End: 2019-05-18 | Stop reason: HOSPADM

## 2019-05-16 RX ORDER — SIMETHICONE 80 MG
80 TABLET,CHEWABLE ORAL
Status: DISCONTINUED | OUTPATIENT
Start: 2019-05-16 | End: 2019-05-18 | Stop reason: HOSPADM

## 2019-05-16 RX ORDER — ACETAMINOPHEN 325 MG/1
650 TABLET ORAL
Status: DISCONTINUED | OUTPATIENT
Start: 2019-05-16 | End: 2019-05-18 | Stop reason: HOSPADM

## 2019-05-16 RX ORDER — NALOXONE HYDROCHLORIDE 0.4 MG/ML
0.4 INJECTION, SOLUTION INTRAMUSCULAR; INTRAVENOUS; SUBCUTANEOUS AS NEEDED
Status: DISCONTINUED | OUTPATIENT
Start: 2019-05-16 | End: 2019-05-18 | Stop reason: HOSPADM

## 2019-05-16 RX ORDER — ZOLPIDEM TARTRATE 5 MG/1
5 TABLET ORAL
Status: DISCONTINUED | OUTPATIENT
Start: 2019-05-16 | End: 2019-05-18 | Stop reason: HOSPADM

## 2019-05-16 RX ORDER — CEFAZOLIN SODIUM/WATER 2 G/20 ML
SYRINGE (ML) INTRAVENOUS
Status: DISCONTINUED
Start: 2019-05-16 | End: 2019-05-16 | Stop reason: WASHOUT

## 2019-05-16 RX ORDER — IBUPROFEN 400 MG/1
800 TABLET ORAL EVERY 8 HOURS
Status: DISCONTINUED | OUTPATIENT
Start: 2019-05-16 | End: 2019-05-18 | Stop reason: HOSPADM

## 2019-05-16 RX ORDER — OXYTOCIN/RINGER'S LACTATE 20/1000 ML
PLASTIC BAG, INJECTION (ML) INTRAVENOUS
Status: COMPLETED
Start: 2019-05-16 | End: 2019-05-16

## 2019-05-16 RX ADMIN — Medication 20000 MILLI-UNITS: at 13:21

## 2019-05-16 RX ADMIN — OXYCODONE AND ACETAMINOPHEN 1 TABLET: 5; 325 TABLET ORAL at 20:49

## 2019-05-16 RX ADMIN — Medication 12 ML/HR: at 09:51

## 2019-05-16 RX ADMIN — IBUPROFEN 800 MG: 400 TABLET, FILM COATED ORAL at 23:58

## 2019-05-16 RX ADMIN — PENICILLIN G POTASSIUM 2.5 MILLION UNITS: 20000000 POWDER, FOR SOLUTION INTRAVENOUS at 04:54

## 2019-05-16 RX ADMIN — VENLAFAXINE HYDROCHLORIDE 75 MG: 37.5 CAPSULE, EXTENDED RELEASE ORAL at 08:59

## 2019-05-16 RX ADMIN — Medication 12 ML/HR: at 02:52

## 2019-05-16 RX ADMIN — PENICILLIN G POTASSIUM 2.5 MILLION UNITS: 20000000 POWDER, FOR SOLUTION INTRAVENOUS at 09:00

## 2019-05-16 RX ADMIN — SODIUM CHLORIDE, SODIUM LACTATE, POTASSIUM CHLORIDE, AND CALCIUM CHLORIDE 125 ML/HR: 600; 310; 30; 20 INJECTION, SOLUTION INTRAVENOUS at 03:50

## 2019-05-16 RX ADMIN — VENLAFAXINE HYDROCHLORIDE 37.5 MG: 37.5 CAPSULE, EXTENDED RELEASE ORAL at 09:00

## 2019-05-16 RX ADMIN — PENICILLIN G POTASSIUM 2.5 MILLION UNITS: 20000000 POWDER, FOR SOLUTION INTRAVENOUS at 01:01

## 2019-05-16 RX ADMIN — LABETALOL HYDROCHLORIDE 100 MG: 100 TABLET, FILM COATED ORAL at 09:01

## 2019-05-16 NOTE — PROGRESS NOTES
Pt comfortable after epidural. Cx checked at 1950 hrs and found to be 4cm/60%/-3/IUPC placed with out difficulty. IUPC with clear fluid and non bloody. Good wave form. Cat 1 RNST.

## 2019-05-16 NOTE — PROGRESS NOTES
Bedside shift change report given to L. Claudette Leos (oncoming nurse) by BANG Aguilera RN (offgoing nurse). Report included the following information SBAR.

## 2019-05-16 NOTE — PROGRESS NOTES
Pitocin at 9 mU. Cx 6-7 cm at 0100 hrs by nursing. MVUs inadequate. Spoke to nursing and pitocin due to go up. Cat1 tracing. Will continue to titrate Pitocin and plan to recheck Cx at 0500 hrs.

## 2019-05-16 NOTE — PROGRESS NOTES
Patient now pusing x ~2hrs with slow progress in 2nd stage. FHR remains category 1  IUPC q 2-3 min    Fetal position assessed, palpates as OP. Discussed options of a short rest followed by continued pushing efforts, trial of operative vaginal delivery, or proceeding with  delivery. Patient would prefer to deliver vaginally if possible, will take a short rest and then continue pushing. If no further progress, she is amenable to attempt at operative vaginal delivery.

## 2019-05-16 NOTE — ROUTINE PROCESS
@ 1600 Verbal bedside report given to BANG Crocker,  in  room 3435. Report consisted of patients Situation, Background, Assessment and Recommendations(SBAR). Information from the following report(s) SBAR, Kardex, Intake/Output, MAR and Recent Results were reviewed with the receiving nurse. Opportunity for questions and clarification was provided,care assumed.

## 2019-05-16 NOTE — PROGRESS NOTES
Pt examined at 0500 hrs and Cx found to be 8/100/0st/Vtx with no caput or molding. IUPC shows inadequate MVUs despite regular increasing of Pitocin. Will continue to titrate Pitocin upward. Cat 1 tracing with no decels and good accels.

## 2019-05-16 NOTE — PROGRESS NOTES
Pt c/o fatigue. She has been pushing x ~1 hr.    FHR baseline 140 moderate variability, intermittent variable decels when pushing    Cvx C/C/+2  Appropriate maternal pushing efforts noted. Reviewed possibility of operative vaginal delivery if maternal exhaustion is extreme or if fetus does not tolerate prolonged second stage, although I do not think this is indicated at this time. Risks/benefits of operative vaginal delivery reviewed with patient and family. Patient wishes to continue her pushing efforts, will continue to monitor progress.     Jillian Farr MD

## 2019-05-16 NOTE — L&D DELIVERY NOTE
Delivery Summary    Patient: Lydia Donnelly MRN: [id number]  SSN: [id number]    YOB: 1995  Age: 26 y.o. Sex: female      Patient progressed to complete dilation and pushed x ~3 hrs. Initially head was in OP position,but after some pushing in knee chest position, vertex rotated to OA. Vacuum assisted delivery recommended due to prolonged second stage and maternal exhaustion. Kiwi vacuum applied and with maternal expulsive efforts, steady gentle traction was applied through 8 contractions over the course of 15 minutes. Vacuum pressure was released between contractions. There was 1 popoff. After vacuum assistance brought the head near to delivery, the head was delivered with a modified Rittgen maneuver. Head restituted naturally to LOT and no nuchal cord was palpable. Anterior (right) shoulder was delivering slowly with maternal expulsive efforts, patient placed in Ernesto position and suprapubic pressure applied with subsequent delivery of anterior shoulder. Remainder of infant delivered without difficulty, and  was handed to the awaiting NICU team after the cord was clamped and cut. A second clamp was placed on the umbilical cord so that cord gases could be collected. The placenta delivered spontaneously and appeared to be complete. Perineum inspected, 2nd degree laceration was repaired with 3-0 chromic. Good hemostasis was noted. EBL 400cc. Information for the patient's :  Agustobhavinhermelinda Harry [[id number]]       Labor Events:    Labor: No    Steroids: None   Cervical Ripening Date/Time:       Cervical Ripening Type: None   Antibiotics During Labor: Yes   Rupture Identifier:      Rupture Date/Time: 5/15/2019 6:01 PM   Rupture Type: AROM   Amniotic Fluid Volume:  Moderate    Amniotic Fluid Description: Clear    Amniotic Fluid Odor: None    Induction: Oxytocin       Induction Date/Time: 5/15/2019 1:24 PM    Indications for Induction: Hypertension    Augmentation: None Augmentation Date/Time:      Indications for Augmentation:     Labor complications: Additional complications:        Delivery Events:  Indications For Episiotomy:     Episiotomy: None   Perineal Laceration(s): 2nd   Repaired:     Periurethral Laceration Location:      Repaired:     Labial Laceration Location:     Repaired:     Sulcal Laceration Location:     Repaired:     Vaginal Laceration Location:     Repaired:     Cervical Laceration Location:     Repaired:     Repair Suture: Chromic 3-0   Number of Repair Packets: 2   Estimated Blood Loss (ml):  ml     Delivery Date: 2019    Delivery Time: 1:09 PM    Delivery Type: Vaginal, Vacuum (Extractor)  Vacuum attempted? No    Vacuum indication:     Vacuum type: Kiwi   Application location: Outlet   First Attempt     Time applied: 2019 12:53 PM   Time removed: 2019 12:53 PM   Second Attempt    Time applied:     Time removed: Third Attempt    Time applied:     Time removed:     Number of pulls: 8   Number of pop-offs: 1   Low-end pressure range:     High-end pressure range: Total application time: 15 minutes   Applied by: DR Jaja Marques   Failed? No     Sex:  Male     Gestational Age: 38w3d  Delivery Clinician:  Lisa Almonte  Living Status: Living   Delivery Location: L&D 3166          APGARS  One minute Five minutes Ten minutes   Skin color: 0   1   1     Heart rate: 1   2   2     Grimace: 1   1   1     Muscle tone: 0   1   2     Breathin   2   2     Totals: 3   7   8       Presentation: Vertex    Position:        Resuscitation Method:  Suctioning-deep;PPV;Suctioning-bulb;C-PAP     Meconium Stained: Thin      Cord Information: 3 Vessels  Complications:    Cord around:    Delayed cord clamping?  No  Cord clamped date/time:2019  1:21 PM  Disposition of Cord Blood:      Blood Gases Sent?:      Placenta:  Date/Time: 2019  1:18 PM  Removal: Spontaneous      Appearance: Intact     [place] Measurements:  Birth Weight: 3.59 kg Birth Length: 53.3 cm      Head Circumference: 34.9 cm      Chest Circumference: 33 cm     Abdominal Girth: 31.8 cm    Other Providers:   ABNER JEFFERS;ROSE DAMIAN;LAYA CHRISTINA;BRODERICK MCLEOD;JOSÉ MIGUEL BACON;FRANSISCO JUAREZ;ANAYA ESQUEDA;BLAKE REYES, Obstetrician;Primary Nurse;Primary Bear Nurse;Nicu Nurse;Neonatologist;Staff Nurse;Charge Nurse;Nursery Nurse     The indication, risks, and benefits of vacuum delivery compared to  delivery were discussed with the patient and attendant family member. All questions were answered. Bladder was drained prior to instrumentation. Instrumentation easily achieved and positioning was checked and verified prior to attempt at deliver. Group B Strep:   Lab Results   Component Value Date/Time    GrBStrep, External POSITIVE 2019     Information for the patient's :  Junito Del Cid [[id number]]   No results found for: ABORH, PCTABR, PCTDIG, BILI, ABORHEXT, ABORH    No results for input(s): PCO2CB, PO2CB, HCO3I, SO2I, IBD, PTEMPI, SPECTI, PHICB, ISITE, IDEV, IALLEN in the last 72 hours.

## 2019-05-16 NOTE — ROUTINE PROCESS
@ 0440  Bedside SBAR report received from Deann Kaiser RN  on Rosalina Bravo  in room  3166,care assumed.

## 2019-05-16 NOTE — PROGRESS NOTES
Patient pushed in knee-chest position for the last half hour. FHR remains category 1    Fetal vertex now palpates as OA, but still at +2 station    Discussed trial of vacuum assisted delivery due to prolonged second stage and maternal exhaustion, patient wishes to proceed.

## 2019-05-16 NOTE — PROGRESS NOTES
2300: Bedside shift change report given to Sherry Momin RN (oncoming nurse) by ANT Payne RN (offgoing nurse). Report included the following information SBAR.    0107: Repositioned pt L lat with peanut ball in place. 0235: Pt repositioned to trendelenburg. 0256: Pt repositioned to L side lye release. 0598: Pt repositioned to R side lye release. 80: Updated Dr. Dustin Rosario on pt status. 8493: Pt repositioned to throne position. 2112: Dr. Dustin Rosario at bedside to assess pt. SVE completed, pt is 8cm. 0600: Pt c/o of feeling cramping. Repositioned L lat. And given 5ml bolus of epidural.    6552: Pt denies pain at this time. 0700:  Pt repositioned L lat with peanut ball in place. 8770: Bedside shift change report given to AUDRA Cary RN (oncoming nurse) by Karlee Tenorio (offgoing nurse). Report included the following information SBAR.

## 2019-05-16 NOTE — PROGRESS NOTES
Patient comfortable with epidural.    FHR category 1    Cvx C/C/+1 per Dr. Iman Chacko last exam.    Patient laboring down, will begin active pushing efforts when reaches at least +2 station.     [place] MD

## 2019-05-17 PROCEDURE — 74011250637 HC RX REV CODE- 250/637: Performed by: OBSTETRICS & GYNECOLOGY

## 2019-05-17 PROCEDURE — 77030021125

## 2019-05-17 PROCEDURE — 65410000002 HC RM PRIVATE OB

## 2019-05-17 RX ORDER — DOCUSATE SODIUM 100 MG/1
100 CAPSULE, LIQUID FILLED ORAL DAILY
Status: DISCONTINUED | OUTPATIENT
Start: 2019-05-17 | End: 2019-05-18 | Stop reason: HOSPADM

## 2019-05-17 RX ADMIN — VENLAFAXINE HYDROCHLORIDE 37.5 MG: 37.5 CAPSULE, EXTENDED RELEASE ORAL at 08:55

## 2019-05-17 RX ADMIN — VENLAFAXINE HYDROCHLORIDE 75 MG: 37.5 CAPSULE, EXTENDED RELEASE ORAL at 08:56

## 2019-05-17 RX ADMIN — IBUPROFEN 800 MG: 400 TABLET, FILM COATED ORAL at 08:04

## 2019-05-17 RX ADMIN — DOCUSATE SODIUM 100 MG: 100 CAPSULE, LIQUID FILLED ORAL at 11:37

## 2019-05-17 RX ADMIN — IBUPROFEN 800 MG: 400 TABLET, FILM COATED ORAL at 16:54

## 2019-05-17 NOTE — LACTATION NOTE
This note was copied from a baby's chart. Attempted to latch baby onto natural breast.  Infant able to latch, but not maintain and feed. Breast assessment as documented. Nipple shield recommended due to short, erica nipples, baby unable to maintain latch. Pros and cons of nipple shield use reviewed. Patient instructed how to apply shield to nipple/areola and cleaning of nipple shield. Nipple shield plan of care includes breastfeeding with nipple shield per instructions, complement/supplement pumped breast milk by pump 15 minutes pc. Reinforces with pt that nipple shield is best used as temporary tool/aid to help infant learn how to latch onto breast.  Recommend follow-up with OP lactation consultant after discharge from hospital.  Reviewed community resources for breastfeeding support. Mother has a new electric pump at home for use and works at [employer] and is familiar with [facility] as outpatient support. Mother already familiar with using pump, was able to pump 15 cc earlier today. Plan for next feeding is to begin feeding using shield and assess and difference in success of baby's latch. Lactation number left on white board. Reviewed breastfeeding basics:  Supply and demand,  stomach size, early  Feeding cues, skin to skin, positioning and baby led latch-on, assymetrical latch with signs of good, deep latch vs shallow, feeding frequency and duration, and log sheet for tracking infant feedings and output. Breastfeeding Booklet and Warm line information given. Discussed typical  weight loss and the importance of infant weight checks with pediatrician 1-2 post discharge. Pt will successfully establish breastfeeding by feeding in response to early feeding cues   or wake every 3h, will obtain deep latch, and will keep log of feedings/output.   Taught to BF at hunger cues and or q 2-3 hrs and to offer 10-20 drops of hand expressed colostrum at any non-feeds.       Breast Assessment  Left Breast: Medium  Left Nipple: Everted, Intact  Right Breast: Medium  Right Nipple: Everted, Intact  Breast- Feeding Assessment  Attends Breast-Feeding Classes: No  Breast-Feeding Experience: No  Breast Trauma/Surgery: No  Type/Quality: Good  Lactation Consultant Visits  Breast-Feedings: Fair     LATCH Documentation  Latch: Repeated attempts, hold nipple in mouth, stimulate to suck  Audible Swallowing: None  Type of Nipple: Everted (after stimulation)  Comfort (Breast/Nipple): Soft/non-tender  Hold (Positioning): No assist from staff, mother able to position/hold infant  LATCH Score: 7

## 2019-05-17 NOTE — ROUTINE PROCESS
[id number]  Bedside and Verbal shift change report given to ESDRAS Mondrgaon RN (oncoming nurse) by ANT Young (offgoing nurse). Report included the following information SBAR, Kardex, Intake/Output and MAR. All patient care and documentation has been reviewed by BANG Kapoor RN (preceptor). [id number]  All patient care and documentation has been reviewed by KIRSTIN Glass (preceptor).

## 2019-05-17 NOTE — PROGRESS NOTES
Post-Partum Day Number 1 Progress Note    Saundra Mae     Assessment: Doing well, post partum day 1    Plan:  1. Continue routine postpartum and perineal care as well as maternal education. 2. The risks and benefits of the circumcision  procedure and anesthesia including: bleeding, infection, variability of cosmetic results were discussed at length with the mother. She is aware that future repeat procedures may be necessary. She gives informed consent to proceed as noted and her questions are answered. 3. CHTN on Labetalol 100 BID- BPs normal-Will continue monitor blood pressure  4. H/o depression well controlled on Effexor    Information for the patient's :  Mina Lu [[id number]]   Vaginal, Vacuum (Extractor)   Patient doing well without significant complaint. Voiding without difficulty, normal lochia. Vitals:  Visit Vitals  BP 97/61 (BP 1 Location: Right arm, BP Patient Position: At rest)   Pulse 77   Temp 98.3 °F (36.8 °C)   Resp 18   Ht 5' 5\" (1.651 m)   Wt 97.5 kg (215 lb)   SpO2 99%   Breastfeeding? Unknown   BMI 35.78 kg/m²     Temp (24hrs), Av.4 °F (36.9 °C), Min:97.6 °F (36.4 °C), Max:99.2 °F (37.3 °C)        Exam:   Patient without distress. Abdomen soft, fundus firm, nontender                Perineum with normal lochia noted. Lower extremities are negative for swelling, cords or tenderness. Labs:     Lab Results   Component Value Date/Time    WBC 7.9 05/15/2019 12:59 PM    WBC 6.9 2018 04:01 PM    WBC 6.4 2017 08:23 AM    HGB 12.0 05/15/2019 12:59 PM    HGB 13.4 2018 04:01 PM    HGB 13.9 2017 08:23 AM    HCT 36.0 05/15/2019 12:59 PM    HCT 40.2 2018 04:01 PM    HCT 40.6 2017 08:23 AM    PLATELET 392  12:59 PM    PLATELET 858  04:01 PM    PLATELET 707  08:23 AM       No results found for this or any previous visit (from the past 24 hour(s)).

## 2019-05-18 VITALS
OXYGEN SATURATION: 99 % | WEIGHT: 215 LBS | HEART RATE: 98 BPM | RESPIRATION RATE: 18 BRPM | SYSTOLIC BLOOD PRESSURE: 116 MMHG | BODY MASS INDEX: 35.82 KG/M2 | TEMPERATURE: 98.5 F | HEIGHT: 65 IN | DIASTOLIC BLOOD PRESSURE: 81 MMHG

## 2019-05-18 PROCEDURE — 74011250637 HC RX REV CODE- 250/637: Performed by: OBSTETRICS & GYNECOLOGY

## 2019-05-18 RX ORDER — IBUPROFEN 600 MG/1
600 TABLET ORAL
Qty: 30 TAB | Refills: 0 | Status: SHIPPED | OUTPATIENT
Start: 2019-05-18 | End: 2019-06-27 | Stop reason: ALTCHOICE

## 2019-05-18 RX ADMIN — IBUPROFEN 800 MG: 400 TABLET, FILM COATED ORAL at 08:34

## 2019-05-18 RX ADMIN — VENLAFAXINE HYDROCHLORIDE 75 MG: 37.5 CAPSULE, EXTENDED RELEASE ORAL at 10:27

## 2019-05-18 RX ADMIN — IBUPROFEN 800 MG: 400 TABLET, FILM COATED ORAL at 01:12

## 2019-05-18 RX ADMIN — VENLAFAXINE HYDROCHLORIDE 37.5 MG: 37.5 CAPSULE, EXTENDED RELEASE ORAL at 10:27

## 2019-05-18 RX ADMIN — DOCUSATE SODIUM 100 MG: 100 CAPSULE, LIQUID FILLED ORAL at 08:34

## 2019-05-18 NOTE — DISCHARGE INSTRUCTIONS
POST DELIVERY DISCHARGE INSTRUCTIONS    Name: Glen Watkins  YOB: 1995  Primary Diagnosis: Active Problems:    Chronic hypertension with exacerbation during pregnancy in third trimester (5/15/2019)        General:     Diet/Diet Restrictions:  · Eight 8-ounce glasses of fluid daily (water, juices); avoid excessive caffeine intake. · Meals/snacks as desired which are high in fiber and carbohydrates and low in fat and cholesterol. Medications:         Physical Activity / Restrictions / Safety:     · Avoid heavy lifting, no more that 8 lbs. For 2-3 weeks;   · Limit use of stairs to 2 times daily for the first week home. · No driving for one week. · Avoid intercourse 4-6 weeks, no douching or tampon use. · Check with obstetrician before starting or resuming an exercise program.      Discharge Instructions/Special Treatment/Home Care Needs:     · Continue prenatal vitamins. · Continue to use squirt bottle with warm water on your episiotomy after each bathroom use until bleeding stops. · If steri-strips applied to your incision, remove in 7-10 days. Call your doctor for the following:     · Fever over 101 degrees by mouth. · Vaginal bleeding heavier than a normal menstrual period or clots larger than a golf ball. · Red streaks or increased swelling of legs, painful red streaks on your breast.  · Painful urination, constipation and increased pain or swelling or discharge with your incision. · If you feel extremely anxious or overwhelmed. · If you have thoughts of harming yourself and/or your baby. Pain Management:     · Take Acetaminophen (Tylenol) or Ibuprofen (Advil, Motrin), as directed for pain. · Use a warm Sitz bath 3 times daily to relieve episiotomy or hemorrhoidal discomfort. · For hemorrhoidal discomfort, use Tucks and Anusol cream as needed and directed. · Heating pad to  incision as needed.      Follow-Up Care:     Appointment with MD:   Follow-up Appointments Procedures    FOLLOW UP VISIT Appointment in: 3 - 5 Days With Dr Андрей Mendez for blood pressure check     With Dr Андрей Mendez for blood pressure check     Standing Status:   Standing     Number of Occurrences:   1     Order Specific Question:   Appointment in     Answer:   3 - 5 Days     Telephone number: [phone]    Signed By: Elisha Cortez MD                                                                                                   Date: 5/18/2019 Time: 9:12 AM    Vaginal Childbirth (Postpartum Care): After Your Visit     Your Care Instructions    After childbirth (postpartum period), your body goes through many changes. Some of these changes happen over several weeks. It is easy to get too tired and overwhelmed during the first weeks after childbirth. Take it easy on yourself. You may find it hard to meet the extra demands on your energy and time. Get rest whenever you can, accept help from others, and eat well and drink plenty of fluids. Your body will slowly heal in the next few weeks. You may feel emotional during this time. Changes in your hormones can shift your mood without warning. No heavy lifting. No more than 8 lbs or the size of baby for 2-3 weeks. Avoid stairs. Limit to 1-2 times per day for the 1st week after delivery. No driving for the first week after delivery. Follow-up care is a key part of your treatment and safety. Be sure to make and go to all appointments, and call your doctor if you are having problems. Its also a good idea to know your test results and keep a list of the medicines you take. Around 4 to 6 weeks after your baby's birth, you will have a follow-up visit with your doctor. This visit is your time to talk to your doctor about anything you are concerned or curious about. Keep a list of questions to bring to your postpartum visit. Your questions might be about:  Changes in your breasts, such as lumps or soreness.   When to expect your menstrual period to start again. What form of birth control is best for you. Weight you have put on during the pregnancy. Exercise options. What foods and drinks are best for you, especially if you are breast-feeding. Problems you might be having with breast-feeding. When you can have sex. Some women may want to talk about lubricants for the vagina. Any feelings of sadness or restlessness that you are having. How can you care for yourself at home? Vaginal bleeding and cramps  After delivery, you will have a bloody discharge from the vagina. This will turn pink within a week and then white or yellow after about 10 days. It may last for 2 to 4 weeks or longer, until the uterus has healed. Do not rinse inside your vagina with fluids (douche). Do not worry if you pass some blood clots, as long as they are smaller than a golf ball. If you have a tear or stitches in your vaginal area, change the pad at least every 4 hours to prevent soreness and infection. You may have cramps for the first few days after childbirth. These are normal and occur as the uterus shrinks to normal size. Take an over-the-counter pain medicine, such as acetaminophen (Tylenol), ibuprofen (Advil, Motrin), or naproxen (Aleve), for cramps. Read and follow all instructions on the label. Do not take aspirin, because it can cause more bleeding. Do not take two or more pain medicines at the same time unless the doctor told you to. Many pain medicines have acetaminophen, which is Tylenol. Too much acetaminophen (Tylenol) can be harmful. Stitches  If you have stitches, they will dissolve on their own and do not need to be removed. Follow your doctor's instructions for cleaning the stitched area. Put ice or a cold pack on your painful area for 10 to 20 minutes at a time. , several times a day, for the first few days. Put a thin cloth between the ice and your skin. Sit in a few inches of warm water (sitz bath) 3 times a day and after bowel movements. The warm water helps with pain and itching. If you do not have a tub, a warm shower might help. Keep the area clean by pouring or spraying warm water over the area outside your vagina and anus after you use the toilet. Breast fullness  Your breasts may overfill (engorge) in the first few days after delivery. To help milk flow and to relieve pain, warm your breasts in the shower or by using warm, moist towels before nursing. If you are not nursing, do not put warmth on your breasts or touch your breasts. Wear a tight bra or sports bra and use ice until the fullness goes away. This usually takes 2 to 3 days. Put ice or a cold pack on your breast after nursing to reduce swelling and pain. Put a thin cloth between the ice and your skin. Activity  Eat a balanced diet. Do not try to lose weight by cutting calories. Keep taking your prenatal vitamins, or take a multivitamin. ·  Get help with household chores from family or friends, if you can. Do not try to do it all yourself. Get as much rest as you can. Try to take naps when your baby sleeps during the day. Get some exercise every day. But do not do any heavy exercise until your doctor says it is okay. Do not have sex or use tampons until you have stopped bleeding and at least 4 to 6 weeks have passed since you gave birth. Talk to your doctor about birth control. You can get pregnant even before your period returns. Also, you can get pregnant while you are breast-feeding. Mental health  It is normal to have some sadness, anxiety, sleeplessness, and mood swings after you go home. If you feel upset or hopeless for more than a few days or are having trouble doing the things you need to do, talk to your doctor. · Many women get the \"baby blues\" during the first few days after childbirth. The \"baby blues\" usually peak around the fourth day and then ease up in less than 2 weeks.  If you have the \"baby blues\" for more than a few days, or if you have thoughts of hurting yourself or your baby, call your doctor right away. Constipation and hemorrhoids  Drink plenty of fluids, enough so that your urine is light yellow or clear like water. If you have kidney, heart, or liver disease and have to limit fluids, talk with your doctor before you increase the amount of fluids you drink. Eat plenty of fiber each day to avoid constipation. Include foods such as whole-grain breads and cereals, raw vegetables, raw and dried fruits, and beans. · If you have hemorrhoids or swelling or pain around the opening of your vagina, try using cold and heat. You can put ice or a cold pack on the area for 10 to 20 minutes at a time. Put a thin cloth between the ice and your skin. Also try sitting in a few inches of warm water (sitz bath) 3 times a day and after bowel movements. When should you call for help? Call 911 anytime you think you may need emergency care. For example, call if:  You are thinking of hurting yourself, your baby, or anyone else. You have sudden, severe pain in your belly. You passed out (lost consciousness). Call your doctor now or seek immediate medical care if:  You have severe vaginal bleeding. You are passing blood clots and soaking through a pad each hour for 2 or more hours. Your vaginal bleeding seems to be getting heavier or is still bright red 4 days after delivery, or you pass blood clots larger than the size of a golf ball. You are dizzy or lightheaded, or you feel like you may faint. You are vomiting or cannot keep fluids down. You have a fever. You have new or more belly pain. You pass tissue (not just blood). Your vaginal discharge smells bad. Your belly feels tender or full and hard. Your breasts are continuously painful or red. You feel sad, anxious, or hopeless for more than a few days. Watch closely for changes in your health, and be sure to contact your doctor if you have any problems. Where can you learn more?     Go to DealExplorer.be    Enter A136  in the search box to learn more about \"Postpartum Care: After Your Visit\". or    Go to DealExplorer.be    Enter Q237  in the search box to learn more about \"Vaginal Childbirth: After Your Visit\". © 4225-2597 [employer]. Care instructions adapted under license by [facility] (which disclaims liability or warranty for this information). This care instruction is for use with your licensed healthcare professional. If you have questions about a medical condition or this instruction, always ask your healthcare professional. Korene Burkitt any warranty or liability for your use of this information.     Follow up with [facility] in 3-5 days for a blood pressure check; call to schedule appointment

## 2019-05-18 NOTE — PROGRESS NOTES
Post-Partum Day Number 2 Progress Note    Radha Bradley     Assessment: Doing well, post partum day 2    Plan:   1. Discharge home today  2. Follow up in office in 6 weeks with Nicolás Richards MD  3. Post partum activity advised, diet as tolerated  4. Discharge Medications: ibuprofen and medications prior to admission  5. H/o depression- doing well on Effexor, signs and symptoms of PP depression reveiwed  6. CHNT on Labetalol- all normal to low BPs without restarting Labetalol- she will monitor at home and continue to hold Labetalol if BPs normal -BP and mood check next week    Information for the patient's :  Annmarie Park [[id number]]   Vaginal, Vacuum (Extractor)   Patient doing well without significant complaint. Voiding without difficulty, normal lochia. Vitals:  Visit Vitals  /81 (BP 1 Location: Left arm, BP Patient Position: Post activity)   Pulse 98   Temp 98.5 °F (36.9 °C)   Resp 18   Ht 5' 5\" (1.651 m)   Wt 97.5 kg (215 lb)   SpO2 99%   Breastfeeding? Unknown   BMI 35.78 kg/m²     Temp (24hrs), Av.4 °F (36.9 °C), Min:98.3 °F (36.8 °C), Max:98.5 °F (36.9 °C)      Exam:         Patient without distress. Abdomen soft, fundus firm, nontender                 Lower extremities are negative for swelling, cords or tenderness. Labs:     Lab Results   Component Value Date/Time    WBC 7.9 05/15/2019 12:59 PM    WBC 6.9 2018 04:01 PM    WBC 6.4 2017 08:23 AM    HGB 12.0 05/15/2019 12:59 PM    HGB 13.4 2018 04:01 PM    HGB 13.9 2017 08:23 AM    HCT 36.0 05/15/2019 12:59 PM    HCT 40.2 2018 04:01 PM    HCT 40.6 2017 08:23 AM    PLATELET 184  12:59 PM    PLATELET 561  04:01 PM    PLATELET 094  08:23 AM       No results found for this or any previous visit (from the past 24 hour(s)).

## 2019-05-18 NOTE — DISCHARGE SUMMARY
Obstetrical Discharge Summary     Name: Yessica Lin MRN: [id number]  SSN: [id number]    YOB: 1995  Age: 26 y.o. Sex: female      Admit Date: 5/15/2019    Discharge Date: 2019     Admitting Physician: Erna Hobbs MD     Attending Physician:  Miguel Angel Robins MD     Admission Diagnoses: Chronic hypertension with exacerbation during pregnancy in third trimester [O10.913]    Discharge Diagnoses:   Information for the patient's :  Marium Goodwinet [[id number]]   Delivery of a 3.59 kg male infant via Vaginal, Vacuum (Extractor) on 2019 at 1:09 PM  by . Apgars were 3 and 7. Additional Diagnoses:   Hospital Problems  Date Reviewed: 5/15/2019          Codes Class Noted POA    Chronic hypertension with exacerbation during pregnancy in third trimester ICD-10-CM: O10.913  ICD-9-CM: 642.03  5/15/2019 Unknown             Lab Results   Component Value Date/Time    Rubella, External IMMUNE 10/01/2018    GrBStrep, External POSITIVE 2019       Hospital Course: Normal hospital course following the delivery. BPs were all normal to low without restarting Labetalol    Disposition at Discharge: Home or self care    Discharged Condition: Stable    Patient Instructions:   Current Discharge Medication List      START taking these medications    Details   ibuprofen (MOTRIN) 600 mg tablet Take 1 Tab by mouth every six (6) hours as needed for Pain for up to 360 days. Qty: 30 Tab, Refills: 0         CONTINUE these medications which have NOT CHANGED    Details   aspirin 81 mg chewable tablet Take 81 mg by mouth daily. labetalol (NORMODYNE) 100 mg tablet Take 100 mg by mouth daily. Indications: high blood pressure      PNV No12-Iron-FA-DSS-OM-3 29 mg iron-1 mg -50 mg CPKD Take 1 Tab by mouth daily. Indications: pregnancy      !! venlafaxine-SR (EFFEXOR-XR) 37.5 mg capsule Take 1 Cap by mouth daily.  To be used with the 75 mg capsule for total dose of 112.5 mg  Qty: 90 Cap, Refills: 1 Associated Diagnoses: Generalized anxiety disorder      !! venlafaxine-SR (EFFEXOR-XR) 75 mg capsule Take 1 Cap by mouth daily. To be used with the 37.5 mg capsule for total dose of 112.5 mg  Qty: 90 Cap, Refills: 1    Associated Diagnoses: Generalized anxiety disorder      JUNEL FE 1/20, 28, 1 mg-20 mcg (21)/75 mg (7) tab TAKE 1 TABLET BY MOUTH DAILY. Qty: 28 Tab, Refills: 0    Comments: Please advise pt has 30 day to be seen has been over a year please make appt      metoprolol succinate (TOPROL-XL) 25 mg XL tablet Take 1 Tab by mouth daily. Indications: hypertension  Qty: 90 Tab, Refills: 3    Associated Diagnoses: Essential hypertension       !! - Potential duplicate medications found. Please discuss with provider. Reference my discharge instructions.     Follow-up Appointments   Procedures    FOLLOW UP VISIT Appointment in: 3 - 5 Days With Dr Carlos Landrum for blood pressure check     With Dr Carlos Landrum for blood pressure check     Standing Status:   Standing     Number of Occurrences:   1     Order Specific Question:   Appointment in     Answer:   3 - 5 Days        Signed By:  Juanis Zheng MD     May 18, 2019

## 2019-11-28 DIAGNOSIS — F41.1 GENERALIZED ANXIETY DISORDER: ICD-10-CM

## 2019-11-28 RX ORDER — VENLAFAXINE HYDROCHLORIDE 37.5 MG/1
37.5 CAPSULE, EXTENDED RELEASE ORAL DAILY
Qty: 90 CAP | Refills: 1 | Status: SHIPPED | OUTPATIENT
Start: 2019-11-28 | End: 2020-02-28 | Stop reason: ALTCHOICE

## 2019-12-23 DIAGNOSIS — F41.1 GENERALIZED ANXIETY DISORDER: ICD-10-CM

## 2019-12-23 RX ORDER — VENLAFAXINE HYDROCHLORIDE 75 MG/1
75 CAPSULE, EXTENDED RELEASE ORAL DAILY
Qty: 90 CAP | Refills: 1 | Status: SHIPPED | OUTPATIENT
Start: 2019-12-23 | End: 2020-02-28 | Stop reason: ALTCHOICE

## 2020-11-11 LAB
ANTIBODY SCREEN, EXTERNAL: NEGATIVE
CHLAMYDIA, EXTERNAL: NEGATIVE
HBSAG, EXTERNAL: NEGATIVE
HIV, EXTERNAL: NON REACTIVE
N. GONORRHEA, EXTERNAL: NEGATIVE
RUBELLA, EXTERNAL: NORMAL
T. PALLIDUM, EXTERNAL: NON REACTIVE

## 2021-04-08 ENCOUNTER — VIRTUAL VISIT (OUTPATIENT)
Dept: FAMILY MEDICINE CLINIC | Age: 26
End: 2021-04-08
Payer: COMMERCIAL

## 2021-04-08 DIAGNOSIS — T21.22XA PARTIAL THICKNESS BURN OF ABDOMINAL WALL, INITIAL ENCOUNTER: Primary | ICD-10-CM

## 2021-04-08 PROCEDURE — 99213 OFFICE O/P EST LOW 20 MIN: CPT | Performed by: NURSE PRACTITIONER

## 2021-04-08 RX ORDER — SILVER SULFADIAZINE 10 G/1000G
CREAM TOPICAL 2 TIMES DAILY
Qty: 20 G | Refills: 0 | Status: SHIPPED | OUTPATIENT
Start: 2021-04-08

## 2021-04-08 RX ORDER — CEPHALEXIN 500 MG/1
500 CAPSULE ORAL 2 TIMES DAILY
Qty: 10 CAP | Refills: 0 | Status: SHIPPED | OUTPATIENT
Start: 2021-04-08 | End: 2021-04-13

## 2021-04-08 NOTE — PROGRESS NOTES
Chief Complaint   Patient presents with    Burn     stomach     1. Have you been to the ER, urgent care clinic since your last visit? Hospitalized since your last visit? No    2. Have you seen or consulted any other health care providers outside of the [address] [place] [address] since your last visit? Include any pap smears or colon screening. No  Fall Risk Assessment, last 12 mths 4/8/2021   Able to walk? Yes   Fall in past 12 months? 0   Do you feel unsteady? 0   Are you worried about falling 0     Abuse Screening Questionnaire 4/8/2021   Do you ever feel afraid of your partner? N   Are you in a relationship with someone who physically or mentally threatens you? N   Is it safe for you to go home?  Y     3 most recent PHQ Screens 4/8/2021   Little interest or pleasure in doing things Not at all   Feeling down, depressed, irritable, or hopeless Not at all   Total Score PHQ 2 0     Learning Assessment 4/8/2021   PRIMARY LEARNER Patient   HIGHEST LEVEL OF EDUCATION - PRIMARY LEARNER  GRADUATED HIGH SCHOOL OR GED   BARRIERS PRIMARY LEARNER NONE   CO-LEARNER CAREGIVER No   PRIMARY LANGUAGE ENGLISH   LEARNER PREFERENCE PRIMARY READING   ANSWERED BY Patient   RELATIONSHIP SELF

## 2021-04-08 NOTE — PATIENT INSTRUCTIONS
[place]: Care Instructions Your Care Instructions [place]even minor onescan be very painful. A minor burn may heal within several days, while a more serious burn may take weeks or even months to heal completely. You may notice that the burned area feels tight and hard while it is healing. It is important to continue to move the area as the burn heals to prevent loss of motion or loss of function in the area. When your skin is damaged by a burn, you have a greater risk of infection. Keep the wound clean and change the bandages regularly to prevent infection and help the burn heal. 
Burns can leave permanent scars. Taking good care of the burn as it heals may help prevent bad scars. The doctor has checked you carefully, but problems can develop later. If you notice any problems or new symptoms, get medical treatment right away. Follow-up care is a key part of your treatment and safety. Be sure to make and go to all appointments, and call your doctor if you are having problems. It's also a good idea to know your test results and keep a list of the medicines you take. How can you care for yourself at home? · If your doctor told you how to care for your burn, follow your doctor's instructions. If you did not get instructions, follow this general advice: 
? Wash the burn with clean water 2 times a day. Don't use hydrogen peroxide or alcohol, which can slow healing. ? Gently pat the burn dry after you wash it. 
? You may cover the burn with a nonstick bandage. There are many bandage products available. Be sure to read the product label for correct use. ? Replace the bandage as needed. · Protect your burn while it is healing. Cover your burn if you are going out in the cold or the sun. ? Wear long sleeves if the burn is on your hands or arms. ? Wear a hat if the burn is on your face. ? Wear socks and shoes if the burn is on your feet. · Do not break blisters open. This increases the chance of infection. If a blister breaks open by itself, blot up the liquid, and leave the skin that covered the blister. This helps protect the new skin. · If your doctor prescribed antibiotics, take them as directed. Do not stop taking them just because you feel better. You need to take the full course of antibiotics. For pain and itching · Take pain medicines exactly as directed. ? If the doctor gave you a prescription medicine for pain, take it as prescribed. ? If you are not taking a prescription pain medicine, ask your doctor if you can take an over-the-counter medicine. · If the burn itches, try not to scratch it. Try an over-the-counter antihistamine such as diphenhydramine (Benadryl) or loratadine (Claritin). Read and follow all instructions on the label. When should you call for help? Call your doctor now or seek immediate medical care if: 
  · Your pain gets worse.  
  · You have symptoms of infection, such as: 
? Increased pain, swelling, warmth, or redness near the burn. ? Red streaks leading from the burn. ? Pus draining from the burn. ? A fever. Watch closely for changes in your health, and be sure to contact your doctor if: 
  · You do not get better as expected. Where can you learn more? Go to http://www.gray.com/ Enter A837 in the search box to learn more about \"Burns: Care Instructions. \" Current as of: February 26, 2020               Content Version: 12.8 © 9615-2196 [employer]. Care instructions adapted under license by [employer] (which disclaims liability or warranty for this information). If you have questions about a medical condition or this instruction, always ask your healthcare professional. [place] [address] any warranty or liability for your use of this information.

## 2021-04-08 NOTE — PROGRESS NOTES
Kimberlyn Byers is a 26 y.o. female who was seen by synchronous (real-time) audio-video technology on 4/8/2021. This encounter was conducted via Doxy. me. Consent:  She and/or her healthcare decision maker is aware that this patient-initiated Telehealth encounter is a billable service, with coverage as determined by her insurance carrier. She is aware that she may receive a bill and has provided verbal consent to proceed: Yes    I was in the office while conducting this encounter. 712  Subjective:   HPI: Kimberlyn Byers was seen for Burn (stomach)    Patient seen today for an acute visit with a CC of a burn to her abdominal wall. She spilled boiling water on her abdomen on 4/2 accidentally. The area has blistered up, scabbed over, and now, re-opened. She reports purulent drainage. She is 30 weeks pregnant. No Known Allergies    Current Outpatient Medications   Medication Sig    cephALEXin (KEFLEX) 500 mg capsule Take 1 Cap by mouth two (2) times a day for 5 days.  silver sulfADIAZINE (SILVADENE) 1 % topical cream Apply  to affected area two (2) times a day. No current facility-administered medications for this visit. Past Medical History:   Diagnosis Date    Essential hypertension     Psychiatric problem     Anxietydepression       No family history on file. ROS:  Denies fever, chills, cough, chest pain, SOB,  nausea, vomiting, diarrhea, dysuria. Denies rashes, + wounds, arthralgias, weakness, numbness, visual changes, depression. Denies wt loss, wt gain, hemoptysis, hematochezia or melena. Patient is not experiencing chest pain radiating to the jaw and/or down the arms. PHYSICAL EXAMINATION:    Vital Signs: (As obtained by patient/caregiver at home)  There were no vitals taken for this visit.      Constitutional: [x] Appears well-developed and well-nourished [x] No apparent distress      [] Abnormal -     Mental status: [x] Alert and awake  [x] Oriented to person/place/time [x] Able to follow commands    [] Abnormal -     Eyes:   EOM    [x]  Normal    [] Abnormal -   Sclera  [x]  Normal    [] Abnormal -          Discharge [x]  None visible   [] Abnormal -     HENT: [x] Normocephalic, atraumatic  [] Abnormal -   [x] Mouth/Throat: Mucous membranes are moist    External Ears [x] Normal  [] Abnormal -    Neck: [x] No visualized mass [] Abnormal -     Pulmonary/Chest: [x] Respiratory effort normal   [x] No visualized signs of difficulty breathing or respiratory distress        [] Abnormal -      Musculoskeletal:   [] Normal gait with no signs of ataxia         [x] Normal range of motion of neck        [] Abnormal -     Neurological:        [x] No Facial Asymmetry (Cranial nerve 7 motor function) (limited exam due to video visit)          [x] No gaze palsy        [] Abnormal -          Skin:        [] No significant exanthematous lesions or discoloration noted on facial skin         [x] Abnormal - 4 cm erythematous wound with serous drainage to R abdomen           Psychiatric:       [x] Normal Affect [] Abnormal -        [x] No Hallucinations    Other pertinent observable physical exam findings:-        Assessment & Plan:       ICD-10-CM ICD-9-CM    1. Partial thickness burn of abdominal wall, initial encounter  T21.22XA 942.23 cephALEXin (KEFLEX) 500 mg capsule      silver sulfADIAZINE (SILVADENE) 1 % topical cream     Tx as above. Discussed wound care. Stop Neosporin. Patient aware of plan of care and verbalized understanding. Questions answered. RTC PRN. We discussed the expected course, resolution and complications of the diagnosis(es) in detail. Medication risks, benefits, costs, interactions, and alternatives were discussed as indicated. I advised her to contact the office if her condition worsens, changes or fails to improve as anticipated. She expressed understanding with the diagnosis(es) and plan.      Pursuant to the emergency declaration under the [address] and the National Emergencies Act, [facility] waiver authority and the [employer] and [employer] Act, this Virtual  Visit was conducted, with patient's consent, to reduce the patient's risk of exposure to COVID-19 and provide continuity of care for an established patient. Services were provided through a video synchronous discussion virtually to substitute for in-person clinic visit.     Ingrid Goddard NP

## 2021-05-21 LAB — GRBS, EXTERNAL: NEGATIVE

## 2021-06-01 ENCOUNTER — CLINICAL SUPPORT (OUTPATIENT)
Dept: FAMILY MEDICINE CLINIC | Age: 26
End: 2021-06-01

## 2021-06-01 DIAGNOSIS — Z20.822 ENCOUNTER FOR PREPROCEDURE SCREENING LABORATORY TESTING FOR COVID-19: Primary | ICD-10-CM

## 2021-06-01 DIAGNOSIS — Z01.812 ENCOUNTER FOR PREPROCEDURE SCREENING LABORATORY TESTING FOR COVID-19: Primary | ICD-10-CM

## 2021-06-01 NOTE — PROGRESS NOTES
Chief Complaint   Patient presents with    Pre-op Exam     Pregnancy Covid screening. Pt came in for preop covid testing for upcoming delivery of her baby. Recent Travel Screening and Travel History documentation     Travel Screening     Question   Response    In the last month, have you been in contact with someone who was confirmed or suspected to have Coronavirus / COVID-19? No / Unsure    Have you had a COVID-19 viral test in the last 14 days? No    Do you have any of the following new or worsening symptoms? None of these    Have you traveled internationally or domestically in the last month?   No      Travel History   Travel since 05/01/21     No documented travel since 05/01/21

## 2021-06-02 LAB
SARS-COV-2, NAA 2 DAY TAT: NORMAL
SARS-COV-2, NAA: NOT DETECTED

## 2021-06-07 ENCOUNTER — ANESTHESIA EVENT (OUTPATIENT)
Dept: LABOR AND DELIVERY | Age: 26
End: 2021-06-07
Payer: COMMERCIAL

## 2021-06-07 ENCOUNTER — HOSPITAL ENCOUNTER (INPATIENT)
Age: 26
LOS: 1 days | Discharge: HOME | End: 2021-06-08
Attending: OBSTETRICS & GYNECOLOGY | Admitting: OBSTETRICS & GYNECOLOGY
Payer: COMMERCIAL

## 2021-06-07 ENCOUNTER — ANESTHESIA (OUTPATIENT)
Dept: LABOR AND DELIVERY | Age: 26
End: 2021-06-07
Payer: COMMERCIAL

## 2021-06-07 PROBLEM — Z34.90 INTRAUTERINE PREGNANCY: Status: ACTIVE | Noted: 2021-06-07

## 2021-06-07 PROBLEM — O10.013 BENIGN ESSENTIAL HTN, CHRONIC, ANTEPARTUM, THIRD TRIMESTER: Status: ACTIVE | Noted: 2021-06-07

## 2021-06-07 LAB
ABO + RH BLD: NORMAL
ALBUMIN SERPL-MCNC: 2.4 G/DL (ref 3.5–5)
ALBUMIN/GLOB SERPL: 0.6 {RATIO} (ref 1.1–2.2)
ALP SERPL-CCNC: 188 U/L (ref 45–117)
ALT SERPL-CCNC: 35 U/L (ref 12–78)
AMPHET UR QL SCN: NEGATIVE
ANION GAP SERPL CALC-SCNC: 7 MMOL/L (ref 5–15)
APPEARANCE UR: ABNORMAL
AST SERPL-CCNC: 34 U/L (ref 15–37)
BACTERIA URNS QL MICRO: ABNORMAL /HPF
BARBITURATES UR QL SCN: NEGATIVE
BASOPHILS # BLD: 0 K/UL (ref 0–0.1)
BASOPHILS NFR BLD: 0 % (ref 0–1)
BENZODIAZ UR QL: NEGATIVE
BILIRUB SERPL-MCNC: 0.2 MG/DL (ref 0.2–1)
BILIRUB UR QL: NEGATIVE
BLOOD GROUP ANTIBODIES SERPL: NORMAL
BUN SERPL-MCNC: 8 MG/DL (ref 6–20)
BUN/CREAT SERPL: 18 (ref 12–20)
CALCIUM SERPL-MCNC: 8.8 MG/DL (ref 8.5–10.1)
CANNABINOIDS UR QL SCN: NEGATIVE
CHLORIDE SERPL-SCNC: 110 MMOL/L (ref 97–108)
CO2 SERPL-SCNC: 21 MMOL/L (ref 21–32)
COCAINE UR QL SCN: NEGATIVE
COLOR UR: ABNORMAL
CREAT SERPL-MCNC: 0.44 MG/DL (ref 0.55–1.02)
CREAT UR-MCNC: 169 MG/DL
DIFFERENTIAL METHOD BLD: ABNORMAL
DRUG SCRN COMMENT,DRGCM: NORMAL
EOSINOPHIL # BLD: 0.1 K/UL (ref 0–0.4)
EOSINOPHIL NFR BLD: 1 % (ref 0–7)
EPITH CASTS URNS QL MICRO: ABNORMAL /LPF
ERYTHROCYTE [DISTWIDTH] IN BLOOD BY AUTOMATED COUNT: 13.5 % (ref 11.5–14.5)
GLOBULIN SER CALC-MCNC: 4.3 G/DL (ref 2–4)
GLUCOSE SERPL-MCNC: 95 MG/DL (ref 65–100)
GLUCOSE UR STRIP.AUTO-MCNC: NEGATIVE MG/DL
HCT VFR BLD AUTO: 35.7 % (ref 35–47)
HGB BLD-MCNC: 11.8 G/DL (ref 11.5–16)
HGB UR QL STRIP: NEGATIVE
HYALINE CASTS URNS QL MICRO: ABNORMAL /LPF (ref 0–5)
IMM GRANULOCYTES # BLD AUTO: 0.1 K/UL (ref 0–0.04)
IMM GRANULOCYTES NFR BLD AUTO: 1 % (ref 0–0.5)
KETONES UR QL STRIP.AUTO: NEGATIVE MG/DL
LDH SERPL L TO P-CCNC: 171 U/L (ref 81–246)
LEUKOCYTE ESTERASE UR QL STRIP.AUTO: ABNORMAL
LYMPHOCYTES # BLD: 1.6 K/UL (ref 0.8–3.5)
LYMPHOCYTES NFR BLD: 19 % (ref 12–49)
MCH RBC QN AUTO: 31 PG (ref 26–34)
MCHC RBC AUTO-ENTMCNC: 33.1 G/DL (ref 30–36.5)
MCV RBC AUTO: 93.7 FL (ref 80–99)
METHADONE UR QL: NEGATIVE
MONOCYTES # BLD: 0.6 K/UL (ref 0–1)
MONOCYTES NFR BLD: 7 % (ref 5–13)
MUCOUS THREADS URNS QL MICRO: ABNORMAL /LPF
NEUTS SEG # BLD: 6.1 K/UL (ref 1.8–8)
NEUTS SEG NFR BLD: 72 % (ref 32–75)
NITRITE UR QL STRIP.AUTO: NEGATIVE
NRBC # BLD: 0 K/UL (ref 0–0.01)
NRBC BLD-RTO: 0 PER 100 WBC
OPIATES UR QL: NEGATIVE
PCP UR QL: NEGATIVE
PH UR STRIP: 6.5 [PH] (ref 5–8)
PLATELET # BLD AUTO: 223 K/UL (ref 150–400)
PMV BLD AUTO: 11.5 FL (ref 8.9–12.9)
POTASSIUM SERPL-SCNC: 3.9 MMOL/L (ref 3.5–5.1)
PROT SERPL-MCNC: 6.7 G/DL (ref 6.4–8.2)
PROT UR STRIP-MCNC: NEGATIVE MG/DL
PROT UR-MCNC: 24 MG/DL (ref 0–11.9)
PROT/CREAT UR-RTO: 0.1
RBC # BLD AUTO: 3.81 M/UL (ref 3.8–5.2)
RBC #/AREA URNS HPF: ABNORMAL /HPF (ref 0–5)
SODIUM SERPL-SCNC: 138 MMOL/L (ref 136–145)
SP GR UR REFRACTOMETRY: 1.03 (ref 1–1.03)
SPECIMEN EXP DATE BLD: NORMAL
UA: UC IF INDICATED,UAUC: ABNORMAL
UROBILINOGEN UR QL STRIP.AUTO: 0.2 EU/DL (ref 0.2–1)
WBC # BLD AUTO: 8.5 K/UL (ref 3.6–11)
WBC URNS QL MICRO: ABNORMAL /HPF (ref 0–4)

## 2021-06-07 PROCEDURE — 0KQM0ZZ REPAIR PERINEUM MUSCLE, OPEN APPROACH: ICD-10-PCS | Performed by: OBSTETRICS & GYNECOLOGY

## 2021-06-07 PROCEDURE — 74011000250 HC RX REV CODE- 250: Performed by: ANESTHESIOLOGY

## 2021-06-07 PROCEDURE — 80053 COMPREHEN METABOLIC PANEL: CPT

## 2021-06-07 PROCEDURE — 74011250637 HC RX REV CODE- 250/637: Performed by: OBSTETRICS & GYNECOLOGY

## 2021-06-07 PROCEDURE — 00HU33Z INSERTION OF INFUSION DEVICE INTO SPINAL CANAL, PERCUTANEOUS APPROACH: ICD-10-PCS | Performed by: OBSTETRICS & GYNECOLOGY

## 2021-06-07 PROCEDURE — 10907ZC DRAINAGE OF AMNIOTIC FLUID, THERAPEUTIC FROM PRODUCTS OF CONCEPTION, VIA NATURAL OR ARTIFICIAL OPENING: ICD-10-PCS | Performed by: OBSTETRICS & GYNECOLOGY

## 2021-06-07 PROCEDURE — 81001 URINALYSIS AUTO W/SCOPE: CPT

## 2021-06-07 PROCEDURE — 87086 URINE CULTURE/COLONY COUNT: CPT

## 2021-06-07 PROCEDURE — 74011250636 HC RX REV CODE- 250/636: Performed by: OBSTETRICS & GYNECOLOGY

## 2021-06-07 PROCEDURE — 74011000250 HC RX REV CODE- 250

## 2021-06-07 PROCEDURE — 83615 LACTATE (LD) (LDH) ENZYME: CPT

## 2021-06-07 PROCEDURE — 86900 BLOOD TYPING SEROLOGIC ABO: CPT

## 2021-06-07 PROCEDURE — 80307 DRUG TEST PRSMV CHEM ANLYZR: CPT

## 2021-06-07 PROCEDURE — 0UQMXZZ REPAIR VULVA, EXTERNAL APPROACH: ICD-10-PCS | Performed by: OBSTETRICS & GYNECOLOGY

## 2021-06-07 PROCEDURE — 85025 COMPLETE CBC W/AUTO DIFF WBC: CPT

## 2021-06-07 PROCEDURE — 36415 COLL VENOUS BLD VENIPUNCTURE: CPT

## 2021-06-07 PROCEDURE — 77030014125 HC TY EPDRL BBMI -B: Performed by: ANESTHESIOLOGY

## 2021-06-07 PROCEDURE — 65410000002 HC RM PRIVATE OB

## 2021-06-07 PROCEDURE — 3E033VJ INTRODUCTION OF OTHER HORMONE INTO PERIPHERAL VEIN, PERCUTANEOUS APPROACH: ICD-10-PCS | Performed by: OBSTETRICS & GYNECOLOGY

## 2021-06-07 PROCEDURE — 84156 ASSAY OF PROTEIN URINE: CPT

## 2021-06-07 RX ORDER — ONDANSETRON 2 MG/ML
4 INJECTION INTRAMUSCULAR; INTRAVENOUS
Status: DISCONTINUED | OUTPATIENT
Start: 2021-06-07 | End: 2021-06-07

## 2021-06-07 RX ORDER — BUPIVACAINE HYDROCHLORIDE AND EPINEPHRINE 2.5; 5 MG/ML; UG/ML
INJECTION, SOLUTION EPIDURAL; INFILTRATION; INTRACAUDAL; PERINEURAL
Status: COMPLETED
Start: 2021-06-07 | End: 2021-06-07

## 2021-06-07 RX ORDER — SODIUM CHLORIDE, SODIUM LACTATE, POTASSIUM CHLORIDE, CALCIUM CHLORIDE 600; 310; 30; 20 MG/100ML; MG/100ML; MG/100ML; MG/100ML
125 INJECTION, SOLUTION INTRAVENOUS CONTINUOUS
Status: DISCONTINUED | OUTPATIENT
Start: 2021-06-07 | End: 2021-06-07

## 2021-06-07 RX ORDER — DOCUSATE SODIUM 100 MG/1
100 CAPSULE, LIQUID FILLED ORAL
Status: DISCONTINUED | OUTPATIENT
Start: 2021-06-07 | End: 2021-06-08 | Stop reason: HOSPADM

## 2021-06-07 RX ORDER — HYDROCORTISONE ACETATE PRAMOXINE HCL 2.5; 1 G/100G; G/100G
CREAM TOPICAL AS NEEDED
Status: DISCONTINUED | OUTPATIENT
Start: 2021-06-07 | End: 2021-06-08 | Stop reason: HOSPADM

## 2021-06-07 RX ORDER — SODIUM CHLORIDE 0.9 % (FLUSH) 0.9 %
5-40 SYRINGE (ML) INJECTION AS NEEDED
Status: DISCONTINUED | OUTPATIENT
Start: 2021-06-07 | End: 2021-06-07

## 2021-06-07 RX ORDER — OXYTOCIN/RINGER'S LACTATE 30/500 ML
87.3 PLASTIC BAG, INJECTION (ML) INTRAVENOUS AS NEEDED
Status: DISCONTINUED | OUTPATIENT
Start: 2021-06-07 | End: 2021-06-07

## 2021-06-07 RX ORDER — FENTANYL/BUPIVACAINE/NS/PF 2-1250MCG
12 PREFILLED PUMP RESERVOIR EPIDURAL CONTINUOUS
Status: DISCONTINUED | OUTPATIENT
Start: 2021-06-07 | End: 2021-06-07

## 2021-06-07 RX ORDER — FENTANYL/BUPIVACAINE/NS/PF 2-1250MCG
PREFILLED PUMP RESERVOIR EPIDURAL
Status: COMPLETED
Start: 2021-06-07 | End: 2021-06-07

## 2021-06-07 RX ORDER — OXYTOCIN/RINGER'S LACTATE 30/500 ML
PLASTIC BAG, INJECTION (ML) INTRAVENOUS
Status: COMPLETED
Start: 2021-06-07 | End: 2021-06-07

## 2021-06-07 RX ORDER — IBUPROFEN 400 MG/1
800 TABLET ORAL EVERY 8 HOURS
Status: DISCONTINUED | OUTPATIENT
Start: 2021-06-07 | End: 2021-06-08 | Stop reason: HOSPADM

## 2021-06-07 RX ORDER — ONDANSETRON 2 MG/ML
4 INJECTION INTRAMUSCULAR; INTRAVENOUS
Status: DISCONTINUED | OUTPATIENT
Start: 2021-06-07 | End: 2021-06-08 | Stop reason: HOSPADM

## 2021-06-07 RX ORDER — OXYTOCIN/RINGER'S LACTATE 30/500 ML
87.3 PLASTIC BAG, INJECTION (ML) INTRAVENOUS AS NEEDED
Status: COMPLETED | OUTPATIENT
Start: 2021-06-07 | End: 2021-06-07

## 2021-06-07 RX ORDER — BUPIVACAINE HYDROCHLORIDE AND EPINEPHRINE 2.5; 5 MG/ML; UG/ML
INJECTION, SOLUTION EPIDURAL; INFILTRATION; INTRACAUDAL; PERINEURAL AS NEEDED
Status: DISCONTINUED | OUTPATIENT
Start: 2021-06-07 | End: 2021-06-07 | Stop reason: HOSPADM

## 2021-06-07 RX ORDER — OXYTOCIN/RINGER'S LACTATE 30/500 ML
10 PLASTIC BAG, INJECTION (ML) INTRAVENOUS AS NEEDED
Status: COMPLETED | OUTPATIENT
Start: 2021-06-07 | End: 2021-06-07

## 2021-06-07 RX ORDER — OXYTOCIN/RINGER'S LACTATE 30/500 ML
10 PLASTIC BAG, INJECTION (ML) INTRAVENOUS AS NEEDED
Status: DISCONTINUED | OUTPATIENT
Start: 2021-06-07 | End: 2021-06-07

## 2021-06-07 RX ORDER — OXYTOCIN/RINGER'S LACTATE 30/500 ML
1 PLASTIC BAG, INJECTION (ML) INTRAVENOUS
Status: DISCONTINUED | OUTPATIENT
Start: 2021-06-07 | End: 2021-06-07

## 2021-06-07 RX ORDER — EPHEDRINE SULFATE/0.9% NACL/PF 50 MG/5 ML
12.5 SYRINGE (ML) INTRAVENOUS
Status: DISCONTINUED | OUTPATIENT
Start: 2021-06-07 | End: 2021-06-07

## 2021-06-07 RX ORDER — ZOLPIDEM TARTRATE 5 MG/1
5 TABLET ORAL
Status: DISCONTINUED | OUTPATIENT
Start: 2021-06-07 | End: 2021-06-08 | Stop reason: HOSPADM

## 2021-06-07 RX ORDER — SODIUM CHLORIDE 0.9 % (FLUSH) 0.9 %
5-40 SYRINGE (ML) INJECTION EVERY 8 HOURS
Status: DISCONTINUED | OUTPATIENT
Start: 2021-06-07 | End: 2021-06-07

## 2021-06-07 RX ORDER — ACETAMINOPHEN 325 MG/1
650 TABLET ORAL
Status: DISCONTINUED | OUTPATIENT
Start: 2021-06-07 | End: 2021-06-08 | Stop reason: HOSPADM

## 2021-06-07 RX ORDER — NALOXONE HYDROCHLORIDE 0.4 MG/ML
0.4 INJECTION, SOLUTION INTRAMUSCULAR; INTRAVENOUS; SUBCUTANEOUS AS NEEDED
Status: DISCONTINUED | OUTPATIENT
Start: 2021-06-07 | End: 2021-06-08 | Stop reason: HOSPADM

## 2021-06-07 RX ORDER — NALOXONE HYDROCHLORIDE 0.4 MG/ML
0.4 INJECTION, SOLUTION INTRAMUSCULAR; INTRAVENOUS; SUBCUTANEOUS AS NEEDED
Status: DISCONTINUED | OUTPATIENT
Start: 2021-06-07 | End: 2021-06-07

## 2021-06-07 RX ORDER — SWAB
1 SWAB, NON-MEDICATED MISCELLANEOUS DAILY
Status: DISCONTINUED | OUTPATIENT
Start: 2021-06-08 | End: 2021-06-08 | Stop reason: HOSPADM

## 2021-06-07 RX ADMIN — SODIUM CHLORIDE, POTASSIUM CHLORIDE, SODIUM LACTATE AND CALCIUM CHLORIDE 125 ML/HR: 600; 310; 30; 20 INJECTION, SOLUTION INTRAVENOUS at 10:27

## 2021-06-07 RX ADMIN — Medication 12 ML/HR: at 12:03

## 2021-06-07 RX ADMIN — BUPIVACAINE HYDROCHLORIDE AND EPINEPHRINE 5 ML: 2.5; 5 INJECTION, SOLUTION EPIDURAL; INFILTRATION; INTRACAUDAL; PERINEURAL at 11:53

## 2021-06-07 RX ADMIN — OXYTOCIN 87.3 MILLI-UNITS/MIN: 10 INJECTION, SOLUTION INTRAMUSCULAR; INTRAVENOUS at 13:49

## 2021-06-07 RX ADMIN — BUPIVACAINE HYDROCHLORIDE AND EPINEPHRINE 5 ML: 2.5; 5 INJECTION, SOLUTION EPIDURAL; INFILTRATION; INTRACAUDAL; PERINEURAL at 11:48

## 2021-06-07 RX ADMIN — SODIUM CHLORIDE, POTASSIUM CHLORIDE, SODIUM LACTATE AND CALCIUM CHLORIDE 125 ML/HR: 600; 310; 30; 20 INJECTION, SOLUTION INTRAVENOUS at 08:11

## 2021-06-07 RX ADMIN — OXYTOCIN 1 MILLI-UNITS/MIN: 10 INJECTION INTRAVENOUS at 08:43

## 2021-06-07 RX ADMIN — BUPIVACAINE HYDROCHLORIDE AND EPINEPHRINE 0.5 ML: 2.5; 5 INJECTION, SOLUTION EPIDURAL; INFILTRATION; INTRACAUDAL; PERINEURAL at 11:47

## 2021-06-07 RX ADMIN — Medication 10000 MILLI-UNITS: at 13:37

## 2021-06-07 RX ADMIN — Medication 1 MILLI-UNITS/MIN: at 08:43

## 2021-06-07 RX ADMIN — SODIUM CHLORIDE, POTASSIUM CHLORIDE, SODIUM LACTATE AND CALCIUM CHLORIDE 999 ML/HR: 600; 310; 30; 20 INJECTION, SOLUTION INTRAVENOUS at 11:02

## 2021-06-07 RX ADMIN — IBUPROFEN 800 MG: 400 TABLET, FILM COATED ORAL at 15:54

## 2021-06-07 NOTE — PROGRESS NOTES
Labor Progress Note  Patient overall comfrotable, starting to feel some cramping with contractions. O:  Visit Vitals  /79 (BP 1 Location: Right upper arm, BP Patient Position: At rest)   Pulse (!) 107   Temp 98.3 °F (36.8 °C)   Resp 18   Ht 5' 4\" (1.626 m)   Wt 96.2 kg (212 lb)   SpO2 98%   Breastfeeding No   BMI 36.39 kg/m²     Gen: NAD  SVE: /-1  AROM performed, clear fluid noted, tolerated well by patient and fetus    FHR: 130 / moderate variability / +accels / no decels, category I   [place]: 2-4 in 10 min  Pit @ 4 mu/min    A/P:  26 y.o.  at 38w3d here for IOL for cHTN. GBS neg. FHR cat I. Maternal/fetal status reassuring. Pregnancy c/b obesity and hx shoulder dystocia for 7#14oz infant, no deficits. FHR cat I.  Maternal/fetal status reassuring.  - continue pitocin  - epidural as desired  - cEFM/toco  - reevaluate in 2-4 hours, sooner PRN  - labs reviewed, no evidence of PIH  - continue to follow closely    Zehra Hough MD  2021  10:24 AM

## 2021-06-07 NOTE — PROGRESS NOTES
1601 Patient arrived ambulatory, in stable condition, with mom and FOB at her side. Denies any leakage of fluid, or vaginal bleeding. Patient changed into gown, left urine sample, and EFM applied after patient in bed. Records show shoulder dystocia in first pregnancy; baby weighed 7# 14oz; will make Dr. Isaias Mayes aware. 7180 Dr. Isaias Mayes at bedside discussing plan of care with patient; discussed history of shoulder dystocia and plan to continue with vaginal delivery unless otherwise indicated during the labor process. 8211 SVE by Dr. Isaias Mayes 4cm/50%/posterior.     1135 Anesthesia at bedside to consent for and place epidural.    1155 Anesthesia procedure complete    1325 landa out; 150    [phone]

## 2021-06-07 NOTE — PROGRESS NOTES
Labor Progress Note  Patient overall comfortable with epidural.     O:  Visit Vitals  /79 (BP 1 Location: Right upper arm, BP Patient Position: At rest)   Pulse (!) 107   Temp 98.3 °F (36.8 °C)   Resp 18   Ht 5' 4\" (1.626 m)   Wt 96.2 kg (212 lb)   SpO2 98%   Breastfeeding No   BMI 36.39 kg/m²     Gen: NAD  SVE: 5-6/90/0  DAVID  Bloody show noted    FHR: 130 / moderate variability / +accels / early decels, category I   Good response to fetal scalp stim  [place]:4-5 in 10 min  Pit @ 8 mu/min    A/P:  26 y.o.  at 38w3d here for IOL for cHTN. GBS neg. FHR cat I. Maternal/fetal status reassuring. Pregnancy c/b obesity and hx shoulder dystocia for 7#14oz infant, no deficits. FHR cat I.  Maternal/fetal status reassuring.  - continue pitocin  - epidural in place  - cEFM/toco  - reevaluate in 2-4 hours, sooner PRN  - continue to follow closely    Ivan Mcmahan MD  2021  12:30 PM

## 2021-06-07 NOTE — L&D DELIVERY NOTE
Delivery Summary  Patient: Jesi Flores             Circumcision:   NA-female  Additional Delivery Comments - 26 y.o.  at 38w3d admitted for IOL for hx cHTN. Started on pitocin, AROM performed. Proceeded from 4 > 6 > fully dilated. Patient found to be fully dilated with fetal head at +2 station. She pushed with excellent maternal effort and proceeded to  of live, vigorous, female  infant from OA position. Infant delivered with maternal expulsive efforts only, and shoulders and body delivered with ease. Loose body cord noted with delivery. The infant was delivered to the patient's chest.  Delayed cord clamping was performed, and the cord was clamped x2 and cut by the patient's mother. At this time the placenta was then delivered spontaneously. The cord was noted to avulse as the placenta was coming out of the cervix so the placenta was grasped and removed. An intrauterine exam was performed and no retained placenta or membranes noted. The vagina/vulva/perineum were inspected and a right periurethral tear was repaired with 3-0 vicryl in running locked fashion and there after hemostatic. A left labial tear that extended into a second degree laceration was also repaired with 3-0 vicryl and the second degree laceration was repaired with 2-0 vicryl until hemostatic. There was no involvement of the perineal skin in the second degree tear, all vaginal.  Mark was placed over the repair for additional hemostasis. All instrument and sponge counts were correct x2. Mother and infant doing well at time of this note.   Peter Dunaway MD  2021  2:00 PM        Information for the patient's :  Cl Walker [[id number]]       Labor Events:    Labor: No    Steroids: None   Cervical Ripening Date/Time:       Cervical Ripening Type: None   Antibiotics During Labor: No   Rupture Identifier: Sac 1    Rupture Date/Time:       Rupture Type:     Amniotic Fluid Volume: Moderate    Amniotic Fluid Description:      Amniotic Fluid Odor:      Induction: Oxytocin;AROM       Induction Date/Time: 2021      Indications for Induction: Hypertension    Augmentation: None   Augmentation Date/Time:      Indications for Augmentation:     Labor complications: None       Additional complications:        Delivery Events:  Indications For Episiotomy:     Episiotomy: None   Perineal Laceration(s):     Repaired:     Periurethral Laceration Location:      Repaired:     Labial Laceration Location:     Repaired:     Sulcal Laceration Location:     Repaired:     Vaginal Laceration Location:     Repaired:     Cervical Laceration Location:     Repaired:     Repair Suture:     Number of Repair Packets:     Estimated Blood Loss (ml):  ml   Quantitative Blood Loss (ml)                Delivery Date: 2021    Delivery Time: 1:30 PM  Delivery Type: Vaginal, Spontaneous  Sex:  Female    Gestational Age: 36w4d   Delivery Clinician:  Deedee Lawrence  Living Status: Living   Delivery Location: L&D            APGARS  One minute Five minutes Ten minutes   Skin color: 0   1        Heart rate: 2   2        Grimace: 2   2        Muscle tone: 2   2        Breathin   2        Totals: 8   9            Presentation: Vertex    Position:        Resuscitation Method:        Meconium Stained:        Cord Information: 3 Vessels  Complications: None  Cord around:    Delayed cord clamping?  Yes  Cord clamped date/time:2021  1:31 PM  Disposition of Cord Blood: Lab    Blood Gases Sent?: No    Placenta:  Date/Time: 2021  1:37 PM  Removal: Spontaneous      Appearance: Intact     [place] Measurements:  Birth Weight: 3.33 kg      Birth Length: 50.8 cm      Head Circumference: 32.5 cm      Chest Circumference: 32 cm     Abdominal Girth: 33 cm    Other Providers:   BERE LAWRENCE;LLUVIA LIRA;FRANSISCO JUAREZ;;;;;;;;MARIAELENA REYNA;ADILENE JAMES, Obstetrician;Primary Nurse;Primary [place] Nurse;Nicu Nurse;Neonatologist;Anesthesiologist;Crna;Nurse Practitioner;Midwife;Nursery Nurse;Scrub Tech;Scrub Tech           Cord pH:  none    Episiotomy: None   Laceration(s):      Estimated Blood Loss (ml): No data found    Labor Events  Method: Oxytocin;AROM      Augmentation: None   Cervical Ripening:     None        Hospital Problems  Date Reviewed: 2021        Codes Class Noted POA    Intrauterine pregnancy ICD-10-CM: Z34.90  ICD-9-CM: V22.2  2021 Unknown        Benign essential HTN, chronic, antepartum, third trimester ICD-10-CM: O10.013  ICD-9-CM: 642.03  2021 Unknown              Operative Vaginal Delivery - none    Group B Strep:   Lab Results   Component Value Date/Time    GrBStrep, External negative 2021 12:00 AM     Information for the patient's :  Rafael Tenorio [[id number]]   No results found for: ABORH, PCTABR, PCTDIG, BILI, ABORHEXT, ABORH     No results found for: APH, APCO2, APO2, AHCO3, ABEC, ABDC, O2ST, EPHV, PCO2V, PO2V, HCO3V, EBEV, EBDV, SITE, RSCOM

## 2021-06-07 NOTE — ANESTHESIA PREPROCEDURE EVALUATION
Relevant Problems   NEUROLOGY   (+) Generalized anxiety disorder      CARDIOVASCULAR   (+) Benign essential HTN, chronic, antepartum, third trimester   (+) Chronic hypertension with exacerbation during pregnancy in third trimester   (+) Essential hypertension       Anesthetic History   No history of anesthetic complications            Review of Systems / Medical History  Patient summary reviewed, nursing notes reviewed and pertinent labs reviewed    Pulmonary  Within defined limits                 Neuro/Psych   Within defined limits      Psychiatric history    Comments: Anxiety/depression Cardiovascular  Within defined limits  Hypertension: well controlled              Exercise tolerance: >4 METS     GI/Hepatic/Renal  Within defined limits              Endo/Other  Within defined limits      Obesity     Other Findings   Comments: IUP           Physical Exam    Airway  Mallampati: III  TM Distance: 4 - 6 cm  Neck ROM: normal range of motion   Mouth opening: Normal     Cardiovascular    Rhythm: regular  Rate: normal         Dental    Dentition: Upper dentition intact and Lower dentition intact     Pulmonary  Breath sounds clear to auscultation               Abdominal  GI exam deferred       Other Findings            Anesthetic Plan    ASA: 2  Anesthesia type: CSE      Post-op pain plan if not by surgeon: indwelling epidural catheter      Anesthetic plan and risks discussed with: Patient and Spouse

## 2021-06-07 NOTE — ROUTINE PROCESS
Shift change report given to Rubio Hayes RN (oncoming nurse) by Duane Ellis RN (offgoing nurse). Report included the following information SBAR.

## 2021-06-07 NOTE — ANESTHESIA PROCEDURE NOTES
CSE Block    Performed by: Thelma Vieira MD  Authorized by: Thelma Vieira MD     Pre-Procedure      OB Combined Spinal-Epidural Note    Risk and benefits were discussed with the patient and plans are to proceed. Patient was placed in the seated position. The back was prepped at the lumbar region with Duraprep. 3 ml 0.25% bupivacaine with 1:200K epinephrine  was used as local at L3 - L4. A 17 Tuohy needle was passed x 1 attempt(s) at the above stated level. Loss of resistance at 7 cm. Needle directed left of center by 1-2 cm. A 25 g pencil point spinal needle was placed through the Touhy until CSF was obtained. 0.5 mL 0.25% bupivacaine with 1:200K epinephrine was deposited into the CSF. A 19 g spring type epidural catheter was placed through the Touhy and secured at 11 cm at the skin. Test dose with 5 mL 0.25% bupivacaine with 1:200 epinephrine was negative. No paresthesia.     Jing Posey MD  6/7/2021

## 2021-06-07 NOTE — H&P
History & Physical    Name: Roly Lo MRN: [id number]  SSN: [id number]    YOB: 1995  Age: 26 y.o. Sex: female      Subjective:     Estimated Date of Delivery: 21  OB History    Para Term  AB Living   2 1 1     1   SAB TAB Ectopic Molar Multiple Live Births           0 1      # Outcome Date GA Lbr Vipul/2nd Weight Sex Delivery Anes PTL Lv   2 Current            1 Term 19 38w4d 19:04 / 04:41 3.59 kg M Vag-Vacuum EPI N WESLEY      Birth Comments: Mother first baby,  GBS +  Treated with PCN for 5 doses. ROM 19 hrs,  Highest maternal temp 99.2   Vaginal delivery with vacuum assistance. Shoulder dystocia at birth.  + subgaleal hemorrhage. Infant required PPV at birth. Bilirubin 8 at 44 hrs. LRZ   Passed hearing screen bilateral.    screen done. Hep B given in nursery. 19        Ms. Kaleb Mason is admitted with pregnancy at 38w3d for induction of labor due to hx cHTN. She denies painful or regular contractions, vaginal bleeding, or LOF. Reports active FM. Denies HA, changes in vision, RUQ pain, CP, or SOB. Prenatal course was complicated by     - anemia on PO iron   - hx cHTN, no meds  - hx anxiety and depression, no meds  - BMI 34 with 10lb gain this pregnancy   - hx shoulder dystocia G1, 7#14oz. Please see prenatal records for details. Past Medical History:   Diagnosis Date    Essential hypertension     Psychiatric problem     Anxietydepression     No past surgical history on file. Social History     Occupational History    Not on file   Tobacco Use    Smoking status: Never Smoker    Smokeless tobacco: Never Used   Vaping Use    Vaping Use: Never used   Substance and Sexual Activity    Alcohol use: No    Drug use: Never    Sexual activity: Yes     Partners: Male     No family history on file. No Known Allergies  Prior to Admission medications    Medication Sig Start Date End Date Taking?  Authorizing Provider   PNV Comb #2-Iron-Omega 3-FA [id number] mg cmpk Take  by mouth. Yes Provider, Historical   silver sulfADIAZINE (SILVADENE) 1 % topical cream Apply  to affected area two (2) times a day. Patient not taking: Reported on 2021   Roberto Zarco NP        Review of Systems: A comprehensive review of systems was negative except for that written in the History of Present Illness. Objective:     Vitals:  Vitals:    21 0723 21 0730 21 0733 21 0738   BP:  117/79     Pulse:  (!) 107     Resp:  18     Temp:  98.3 °F (36.8 °C)     SpO2:  98% 98% 98%   Weight: 96.2 kg (212 lb)      Height: 5' 4\" (1.626 m)           Physical Exam:  Patient without distress. Heart: Regular rate and rhythm  Lung: normal respiratory effort  Abdomen: soft, nontender, gravid  Perineum: blood absent, amniotic fluid absent  Cervical Exam: 4/50/-2, moderate, posterior  Lower Extremities: trace edema, no calf tenderness  Membranes:  Intact  Fetal Heart Rate: Reactive       [place]: rare ctx less than 1 in 10 min     Last US @ 36w6d EFW 3082g (58th%), posterior placenta     EFW by Leopold's 3200g    Prenatal Labs:   Lab Results   Component Value Date/Time    Rubella, External 1.96- immune 2020 12:00 AM    HBsAg, External negative 2020 12:00 AM    HIV, External non reactive 2020 12:00 AM    RPR, External NON REACTIVE 10/01/2018 12:00 AM    Gonorrhea, External negative 2020 12:00 AM    Chlamydia, External negative 2020 12:00 AM    ABO,Rh O POSITIVE 10/01/2018 12:00 AM    GrBStrep, External negative 2021 12:00 AM       Impression/Plan:     Active Problems:    Intrauterine pregnancy (2021)      Benign essential HTN, chronic, antepartum, third trimester (2021)     25 y.o.  at 38w3d here for IOL for hx cHTN. GBS neg. FHR cat I. Maternal/fetal status reassuring. Pregnancy c/b obesity and hx shoulder dystocia for 7#14oz infant, no deficits. Plan: Admit for induction of labor.  Group B Strep negative. - start pitocin  - CBC, CMP, UPC, UDS, and T&S pending  - plan AROM with next check  - epidural as desired  - counseled on increased risk of shoulder dystocia with hx orior shoulder dystocia for 7#14oz infant. This fetus is estimated at 3200g. Reviewed with patient risks of fetal brachial plexus injury, fetal fracture, fetal hypoxic brain injury and fetal death, as well as maternal morbidity, reviewed risks of  section, and patient at this time desires to proceed with vaginal delivery. She does not she declines a vacuum and would prefer to proceed with  section if an operative delivery becomes indicated.    - CLD  - cEFM/toco  - continue to follow closely    Glena Boeck, MD  2021  8:32 AM

## 2021-06-08 VITALS
TEMPERATURE: 98.7 F | RESPIRATION RATE: 16 BRPM | HEIGHT: 64 IN | BODY MASS INDEX: 36.19 KG/M2 | HEART RATE: 83 BPM | OXYGEN SATURATION: 91 % | SYSTOLIC BLOOD PRESSURE: 117 MMHG | WEIGHT: 212 LBS | DIASTOLIC BLOOD PRESSURE: 73 MMHG

## 2021-06-08 LAB
BACTERIA SPEC CULT: NORMAL
SERVICE CMNT-IMP: NORMAL

## 2021-06-08 PROCEDURE — 75410000000 HC DELIVERY VAGINAL/SINGLE

## 2021-06-08 PROCEDURE — 74011250637 HC RX REV CODE- 250/637: Performed by: OBSTETRICS & GYNECOLOGY

## 2021-06-08 PROCEDURE — 75410000003 HC RECOV DEL/VAG/CSECN EA 0.5 HR

## 2021-06-08 PROCEDURE — 75410000002 HC LABOR FEE PER 1 HR

## 2021-06-08 PROCEDURE — 76060000078 HC EPIDURAL ANESTHESIA

## 2021-06-08 RX ORDER — IBUPROFEN 800 MG/1
800 TABLET ORAL EVERY 8 HOURS
Qty: 30 TABLET | Refills: 1 | Status: SHIPPED | OUTPATIENT
Start: 2021-06-08

## 2021-06-08 RX ADMIN — ACETAMINOPHEN 650 MG: 325 TABLET ORAL at 00:23

## 2021-06-08 RX ADMIN — IBUPROFEN 800 MG: 400 TABLET, FILM COATED ORAL at 00:23

## 2021-06-08 RX ADMIN — IBUPROFEN 800 MG: 400 TABLET, FILM COATED ORAL at 08:40

## 2021-06-08 RX ADMIN — DOCUSATE SODIUM 100 MG: 100 CAPSULE, LIQUID FILLED ORAL at 08:40

## 2021-06-08 RX ADMIN — Medication 1 TABLET: at 08:40

## 2021-06-08 NOTE — DISCHARGE SUMMARY
Obstetrical Discharge Summary     Name: Tara Steele MRN: [id number]  SSN: [id number]    YOB: 1995  Age: 26 y.o. Sex: female      Admit Date: 2021    Discharge Date: 2021     Admitting Physician: Champ Quintero MD     Attending Physician:  Susan Alcantara*     Admission Diagnoses: Intrauterine pregnancy [Z34.90]  Benign essential HTN, chronic, antepartum, third trimester [O10.013]    Procedure Performed:      Discharge Diagnoses:   Information for the patient's :  Nasirsarah Cardoso [[id number]]   Delivery of a 3.33 kg female infant via Vaginal, Spontaneous on 2021 at 1:30 PM  by Champ Quintero. Apgars were 8  and 9 . Additional Diagnoses:   Hospital Problems  Date Reviewed: 2021        Codes Class Noted POA    Intrauterine pregnancy ICD-10-CM: Z34.90  ICD-9-CM: V22.2  2021 Unknown        Benign essential HTN, chronic, antepartum, third trimester ICD-10-CM: O10.013  ICD-9-CM: 642.03  2021 Unknown             Lab Results   Component Value Date/Time    Rubella, External 1.96- immune 2020 12:00 AM    GrBStrep, External negative 2021 12:00 AM       Hospital Course: Normal hospital course following the delivery. Patient Disposition: Home      Followup Care:  Discharge Condition: good  No sex for 6 weeks  Regular Diet  Keep wound clean and dry    Patient Instructions:   Current Discharge Medication List      START taking these medications    Details   ibuprofen (MOTRIN) 800 mg tablet Take 1 Tablet by mouth every eight (8) hours. Qty: 30 Tablet, Refills: 1         CONTINUE these medications which have NOT CHANGED    Details   PNV Comb #2-Iron-Omega 3-FA [id number] mg cmpk Take  by mouth.      silver sulfADIAZINE (SILVADENE) 1 % topical cream Apply  to affected area two (2) times a day.   Qty: 20 g, Refills: 0    Associated Diagnoses: Partial thickness burn of abdominal wall, initial encounter             Reference my discharge instructions. Follow-up Appointments   Procedures    FOLLOW UP VISIT Appointment in: One Week     Standing Status:   Standing     Number of Occurrences:   1     Order Specific Question:   Appointment in     Answer:    One Week        Signed By:  Johanny Ramirez MD     June 8, 2021

## 2021-06-08 NOTE — PROGRESS NOTES
Post-Partum Day Number 1 Progress Note    Juli Miller     Assessment: Doing well, post partum day 1, desires dc home    Plan:   1. Discharge home today  2. Follow up in office in 1weeks with Oswaldo Dow for bp check  3. Post partum activity advised, diet as tolerated  4. Discharge Medications: ibuprofen and medications prior to admission    Information for the patient's :  Tony Kapadia [[id number]]   Vaginal, Spontaneous    Patient doing well without significant complaint. Voiding without difficulty, normal lochia. Vitals:  Visit Vitals  /69 (BP 1 Location: Right upper arm)   Pulse 90   Temp 98.3 °F (36.8 °C)   Resp 16   Ht 5' 4\" (1.626 m)   Wt 96.2 kg (212 lb)   SpO2 91%   Breastfeeding Unknown   BMI 36.39 kg/m²     Temp (24hrs), Av.3 °F (36.8 °C), Min:98.1 °F (36.7 °C), Max:98.4 °F (36.9 °C)      Exam:         Patient without distress. Abdomen soft, fundus firm, nontender                 Lower extremities are negative for swelling, cords or tenderness. Labs:     Lab Results   Component Value Date/Time    WBC 8.5 2021 08:15 AM    WBC 7.9 05/15/2019 12:59 PM    WBC 6.9 2018 04:01 PM    WBC 6.4 2017 08:23 AM    HGB 11.8 2021 08:15 AM    HGB 12.0 05/15/2019 12:59 PM    HGB 13.4 2018 04:01 PM    HGB 13.9 2017 08:23 AM    HCT 35.7 2021 08:15 AM    HCT 36.0 05/15/2019 12:59 PM    HCT 40.2 2018 04:01 PM    HCT 40.6 2017 08:23 AM    PLATELET 266  08:15 AM    PLATELET 047  12:59 PM    PLATELET 227  04:01 PM    PLATELET 033  08:23 AM       No results found for this or any previous visit (from the past 24 hour(s)).

## 2021-06-08 NOTE — DISCHARGE INSTRUCTIONS
POST DELIVERY DISCHARGE INSTRUCTIONS    Name: Bonifacio Oropeza  YOB: 1995  Primary Diagnosis: Active Problems:    Intrauterine pregnancy (2021)      Benign essential HTN, chronic, antepartum, third trimester (2021)        General:     Diet/Diet Restrictions:  · Eight 8-ounce glasses of fluid daily (water, juices); avoid excessive caffeine intake. · Meals/snacks as desired which are high in fiber and carbohydrates and low in fat and cholesterol. Physical Activity / Restrictions / Safety:     · Avoid heavy lifting, no more that 8 lbs. For 2-3 weeks;   · Limit use of stairs to 2 times daily for the first week home. · No driving for one week. · Avoid intercourse 4-6 weeks, no douching or tampon use. · Check with obstetrician before starting or resuming an exercise program.      Discharge Instructions/Special Treatment/Home Care Needs:     · Continue prenatal vitamins. · Continue to use squirt bottle with warm water on your episiotomy after each bathroom use until bleeding stops. · If steri-strips applied to your incision, remove in 7-10 days. Call your doctor for the following:     · Fever over 101 degrees by mouth. · Vaginal bleeding heavier than a normal menstrual period or clots larger than a golf ball. · Red streaks or increased swelling of legs, painful red streaks on your breast.  · Painful urination, constipation and increased pain or swelling or discharge with your incision. · If you feel extremely anxious or overwhelmed. · If you have thoughts of harming yourself and/or your baby. Pain Management:     · Take Acetaminophen (Tylenol) or Ibuprofen (Advil, Motrin), as directed for pain. · Use a warm Sitz bath 3 times daily to relieve episiotomy or hemorrhoidal discomfort. · For hemorrhoidal discomfort, use Tucks and Anusol cream as needed and directed. · Heating pad to  incision as needed.      Follow-Up Care:     Appointment with MD:   Follow-up Appointments   Procedures    FOLLOW UP VISIT Appointment in: One Week     Standing Status:   Standing     Number of Occurrences:   1     Order Specific Question:   Appointment in     Answer:    One Week     Telephone number: [phone]    Signed By: Sher Barlow MD                                                                                                   Date: 6/8/2021 Time: 10:14 AM

## 2021-06-08 NOTE — PROGRESS NOTES
Bedside and Verbal shift change report given to TALHA Cortes RN (oncoming nurse) by ESDRAS Pa RN (offgoing nurse).  Report included the following information SBAR, Kardex, Intake/Output, MAR and Recent Results.

## 2021-06-09 NOTE — ROUTINE PROCESS
Patient discharge prescriptions sent to pharmacy & instructions given. Verbalized understanding. All questions answered. No distress noted. Signed copy of discharge instructions on paper chart. Patient to follow up in 1 week with [facility].

## 2022-03-11 ENCOUNTER — OFFICE VISIT (OUTPATIENT)
Dept: FAMILY MEDICINE CLINIC | Age: 27
End: 2022-03-11
Payer: COMMERCIAL

## 2022-03-11 VITALS
RESPIRATION RATE: 20 BRPM | DIASTOLIC BLOOD PRESSURE: 76 MMHG | TEMPERATURE: 97.6 F | BODY MASS INDEX: 30.05 KG/M2 | WEIGHT: 176 LBS | HEIGHT: 64 IN | OXYGEN SATURATION: 99 % | SYSTOLIC BLOOD PRESSURE: 130 MMHG | HEART RATE: 89 BPM

## 2022-03-11 DIAGNOSIS — F39 MOOD DISORDER (HCC): Primary | ICD-10-CM

## 2022-03-11 PROCEDURE — 99214 OFFICE O/P EST MOD 30 MIN: CPT | Performed by: NURSE PRACTITIONER

## 2022-03-11 RX ORDER — NORETHINDRONE ACETATE AND ETHINYL ESTRADIOL AND FERROUS FUMARATE 1MG-20(21)
1 KIT ORAL DAILY
COMMUNITY
Start: 2022-01-03

## 2022-03-11 RX ORDER — QUETIAPINE FUMARATE 25 MG/1
25 TABLET, FILM COATED ORAL
Qty: 90 TABLET | Refills: 0 | Status: SHIPPED | OUTPATIENT
Start: 2022-03-11 | End: 2022-06-08

## 2022-03-11 RX ORDER — SERTRALINE HYDROCHLORIDE 50 MG/1
50 TABLET, FILM COATED ORAL DAILY
COMMUNITY
Start: 2022-01-07 | End: 2022-03-11 | Stop reason: ALTCHOICE

## 2022-03-11 RX ORDER — SERTRALINE HYDROCHLORIDE 100 MG/1
100 TABLET, FILM COATED ORAL DAILY
Qty: 90 TABLET | Refills: 1 | Status: SHIPPED | OUTPATIENT
Start: 2022-03-11

## 2022-03-11 NOTE — PATIENT INSTRUCTIONS
Learning About Mood Disorders  What are mood disorders? Mood disorders are medical problems that affect how you feel. They can impact your moods, thoughts, and actions. Mood disorders include:  · Depression. This causes you to feel sad or hopeless for much of the time. · Bipolar disorder. This causes extreme mood changes from manic episodes of very high energy to extreme lows of depression. · Seasonal affective disorder (SAD). This is a type of depression that affects you during the same season each year. Most often people experience SAD during the fall and winter months when days are shorter and there is less light. What are the symptoms? Depression  You may:  · Feel sad or hopeless nearly every day. · Lose interest in or not get pleasure from most daily activities. You feel this way nearly every day. · Have low energy, changes in your appetite, or changes in how well you sleep. · Have trouble concentrating. · Think about death and suicide. Keep the numbers for these national suicide hotlines: 3-024-655-TALK (2-292.405.9763) and 5-513-AMMTDTW (6-552.552.2048). If you or someone you know talks about suicide or feeling hopeless, get help right away. Bipolar disorder  Symptoms depend on your mood swings. You may:  · Feel very happy, energetic, or on edge. · Feel like you need very little sleep. · Feel overly self-confident. · Do impulsive things, such as spending a lot of money. · Feel sad or hopeless. · Have racing thoughts or trouble thinking and making decisions. · Lose interest in things you have enjoyed in the past.  · Think about death and suicide. Keep the numbers for these national suicide hotlines: 9-215-279-TALK (9-570.905.2764) and 8-971-XQDIKHZ (2-161.725.2941). If you or someone you know talks about suicide or feeling hopeless, get help right away. Seasonal affective disorder (SAD)  Symptoms come and go at about the same time each year.  For most people with SAD, symptoms come during the winter when there is less daylight. You may:  · Feel sad, grumpy, rich, or anxious. · Lose interest in your usual activities. · Eat more and crave carbohydrates, such as bread and pasta. · Gain weight. · Sleep more and feel drowsy during the daytime. How are mood disorders treated? Mood disorders can be treated with medicines or counseling, or a combination of both. Medicines for depression and SAD may include antidepressants. Medicines for bipolar disorder may include:  · Mood stabilizers. · Antipsychotics. · Benzodiazepines. Counseling may involve cognitive-behavioral therapy. It teaches you how to change the ways you think and behave. This can help you stop thinking bad thoughts about yourself and your life. Light therapy is the main treatment for SAD. This therapy uses a special kind of lamp. You let the lamp shine on you at certain times, usually in the morning. This may help your symptoms during the months when there is less sunlight. Healthy lifestyle  Healthy lifestyle changes may help you feel better. · Be active often. You might try walking or strength training. · Eat a healthy diet. Include fruits, vegetables, lean proteins, and whole grains in your diet each day. · Keep a regular sleep schedule. Try for 8 hours of sleep a night. · Find ways to manage stress, such as relaxation exercises. · Avoid alcohol and illegal drugs. Follow-up care is a key part of your treatment and safety. Be sure to make and go to all appointments, and call your doctor if you are having problems. It's also a good idea to know your test results and keep a list of the medicines you take. Where can you learn more? Go to http://www.gray.com/  Enter Z126 in the search box to learn more about \"Learning About Mood Disorders. \"  Current as of: June 16, 2021               Content Version: 13.2  © 2169-8617 Healthwise, [employer].    Care instructions adapted under license by [employer] (which disclaims liability or warranty for this information). If you have questions about a medical condition or this instruction, always ask your healthcare professional. [address] any warranty or liability for your use of this information.

## 2022-03-11 NOTE — PROGRESS NOTES
Chief Complaint   Patient presents with    Depression       HPI:    Clara Betts is a 23 y.o. female in today to discuss mental health. She reports a hx of BEN several years ago for which she took Effexor. She stopped when symptoms improved. She was started on sertraline in December 2021 for PPD. She noted no improvement on 25 mg or 50 mg so she started doubling her dose and has been taking 100 mg. She reports this helps \"a little\" but she continues to have \"down days\" and \"highs\". She is concerned about bipolar. Angie Gray reports a cyclic pattern to her moods. She was on a high last week - slept very little, dyed her hair blue, and reports a general positive and upbeat mood. This week, she is down. She does not want to get out of bed. She is frustrated. She has no desire to do basic things around the house. She finds herself aggravated with her  and her two children. She does report suicidal thoughts but none recently. No plan. She states, \"I would never do that to my children\". Her  is a support- she keeps him informed of how she is feeling. No Known Allergies    Current Outpatient Medications   Medication Sig    Junel FE 1/20, 28, 1 mg-20 mcg (21)/75 mg (7) tab Take 1 Tablet by mouth daily.  sertraline (ZOLOFT) 100 mg tablet Take 1 Tablet by mouth daily.  QUEtiapine (SEROquel) 25 mg tablet Take 1 Tablet by mouth nightly.  ibuprofen (MOTRIN) 800 mg tablet Take 1 Tablet by mouth every eight (8) hours. (Patient not taking: Reported on 3/11/2022)    PNV Comb #2-Iron-Omega 3-FA [id number] mg cmpk Take  by mouth. (Patient not taking: Reported on 3/11/2022)    silver sulfADIAZINE (SILVADENE) 1 % topical cream Apply  to affected area two (2) times a day. (Patient not taking: Reported on 6/7/2021)     No current facility-administered medications for this visit.        Past Medical History:   Diagnosis Date    Essential hypertension     Psychiatric problem     Anxietydepression History reviewed. No pertinent family history. ROS:  Denies fever, chills, cough, chest pain, SOB,  nausea, vomiting, diarrhea, dysuria. Denies rashes, wounds, arthralgias, weakness, numbness, visual changes, depression, + wt loss, wt gain, hemoptysis, hematochezia or melena. Patient is not experiencing chest pain radiating to the jaw and/or down the arms. Physical Examination:    /76 (BP 1 Location: Right arm, BP Patient Position: Sitting, BP Cuff Size: Adult)   Pulse 89   Temp 97.6 °F (36.4 °C) (Temporal)   Resp 20   Ht 5' 4\" (1.626 m)   Wt 176 lb (79.8 kg)   SpO2 99%   BMI 30.21 kg/m²     Wt Readings from Last 3 Encounters:   03/11/22 176 lb (79.8 kg)   06/07/21 212 lb (96.2 kg)   02/28/20 184 lb 3.2 oz (83.6 kg)     Constitutional: WDWN Female in no acute distress  HENT:  NC/AT  EYES: EOMI, PERRL  Neck:  Supple, no JVD, mass or bruit. No thyromegaly. Respiratory:  Respirations even and unlabored without use of accessory muscles, CTA throughout without wheezes, rales, rubs or rhonchi. Symmetrical chest expansion. Cardiac: RRR  Musculoskeletal:  No cyanosis, clubbing or edema of extremities. Moves all extremities without difficulty. Neurologic:  Smooth, even gait without assistance, CN 2-12 grossly intact. Skin: intact and warm to the touch, no rash   Lymphadenopathy: no cervical or supraclavicular nodes  Psych: Pleasant, A+ O x 3, animated, pressure speech.      3 most recent PHQ Screens 3/11/2022   Little interest or pleasure in doing things Not at all   Feeling down, depressed, irritable, or hopeless Nearly every day   Total Score PHQ 2 3   Trouble falling or staying asleep, or sleeping too much Nearly every day   Feeling tired or having little energy Nearly every day   Poor appetite, weight loss, or overeating Nearly every day   Feeling bad about yourself - or that you are a failure or have let yourself or your family down Several days   Trouble concentrating on things such as school, work, reading, or watching TV Several days   Moving or speaking so slowly that other people could have noticed; or the opposite being so fidgety that others notice Not at all   Thoughts of being better off dead, or hurting yourself in some way Not at all   PHQ 9 Score 14           ASSESSMENT AND PLAN:       ICD-10-CM ICD-9-CM    1. Mood disorder (HCC)  F39 296.90 sertraline (ZOLOFT) 100 mg tablet      REFERRAL TO PSYCHIATRY      QUEtiapine (SEROquel) 25 mg tablet     Patient with symptoms highly suggestive of bipolar disorder. MDQ + as is PHQ. Continue on higher dose of Zoloft and start low dose Seroquel at bedtime. Refer to psychiatry. If any thoughts of hurting herself or others, go to the ER. Medication Side Effects and Warnings were discussed with patient:  yes  Patient Labs were reviewed:  yes  Patient Past Records were reviewed:  yes    Patient aware of plan of care and verbalized understanding. Questions answered. RTC in 3 months if unable to see psychiatry, or sooner if needed.     Marianna Boo, NP

## 2022-03-18 PROBLEM — Z34.90 INTRAUTERINE PREGNANCY: Status: ACTIVE | Noted: 2021-06-07

## 2022-03-19 PROBLEM — F41.1 GENERALIZED ANXIETY DISORDER: Status: ACTIVE | Noted: 2018-03-13

## 2022-03-19 PROBLEM — O10.913 CHRONIC HYPERTENSION WITH EXACERBATION DURING PREGNANCY IN THIRD TRIMESTER: Status: ACTIVE | Noted: 2019-05-15

## 2022-03-20 PROBLEM — O10.013 BENIGN ESSENTIAL HTN, CHRONIC, ANTEPARTUM, THIRD TRIMESTER: Status: ACTIVE | Noted: 2021-06-07

## 2022-03-20 PROBLEM — I10 ESSENTIAL HYPERTENSION: Status: ACTIVE | Noted: 2018-03-13

## 2022-06-08 DIAGNOSIS — F39 MOOD DISORDER (HCC): ICD-10-CM

## 2022-06-08 RX ORDER — QUETIAPINE FUMARATE 25 MG/1
25 TABLET, FILM COATED ORAL
Qty: 90 TABLET | Refills: 0 | Status: SHIPPED | OUTPATIENT
Start: 2022-06-08 | End: 2022-09-06

## 2022-09-05 DIAGNOSIS — F39 MOOD DISORDER (HCC): ICD-10-CM

## 2022-09-06 RX ORDER — QUETIAPINE FUMARATE 25 MG/1
25 TABLET, FILM COATED ORAL
Qty: 90 TABLET | Refills: 0 | Status: SHIPPED | OUTPATIENT
Start: 2022-09-06

## 2022-12-15 DIAGNOSIS — F39 MOOD DISORDER (HCC): ICD-10-CM

## 2022-12-15 RX ORDER — QUETIAPINE FUMARATE 25 MG/1
25 TABLET, FILM COATED ORAL
Qty: 90 TABLET | Refills: 0 | Status: SHIPPED | OUTPATIENT
Start: 2022-12-15

## 2023-03-24 DIAGNOSIS — F39 MOOD DISORDER (HCC): ICD-10-CM

## 2023-03-24 RX ORDER — QUETIAPINE FUMARATE 25 MG/1
25 TABLET, FILM COATED ORAL
Qty: 90 TABLET | Refills: 0 | Status: SHIPPED | OUTPATIENT
Start: 2023-03-24

## 2023-05-11 ENCOUNTER — OFFICE VISIT (OUTPATIENT)
Age: 28
End: 2023-05-11
Payer: COMMERCIAL

## 2023-05-11 VITALS
WEIGHT: 166 LBS | BODY MASS INDEX: 28.34 KG/M2 | HEART RATE: 70 BPM | SYSTOLIC BLOOD PRESSURE: 130 MMHG | HEIGHT: 64 IN | TEMPERATURE: 97.2 F | DIASTOLIC BLOOD PRESSURE: 80 MMHG | RESPIRATION RATE: 18 BRPM

## 2023-05-11 DIAGNOSIS — T14.8XXA BRUISING: Primary | ICD-10-CM

## 2023-05-11 LAB
POC INR: 1
PROTHROMBIN TIME, POC: 11.6

## 2023-05-11 PROCEDURE — 99213 OFFICE O/P EST LOW 20 MIN: CPT | Performed by: NURSE PRACTITIONER

## 2023-05-11 PROCEDURE — 3079F DIAST BP 80-89 MM HG: CPT | Performed by: NURSE PRACTITIONER

## 2023-05-11 PROCEDURE — 36415 COLL VENOUS BLD VENIPUNCTURE: CPT | Performed by: NURSE PRACTITIONER

## 2023-05-11 PROCEDURE — 3075F SYST BP GE 130 - 139MM HG: CPT | Performed by: NURSE PRACTITIONER

## 2023-05-11 RX ORDER — SERTRALINE HYDROCHLORIDE 100 MG/1
100 TABLET, FILM COATED ORAL DAILY
COMMUNITY

## 2023-05-11 RX ORDER — LAMOTRIGINE 200 MG/1
200 TABLET ORAL DAILY
COMMUNITY

## 2023-05-11 RX ORDER — NORETHINDRONE ACETATE AND ETHINYL ESTRADIOL 1MG-20(21)
1 KIT ORAL DAILY
COMMUNITY

## 2023-05-11 SDOH — HEALTH STABILITY: PHYSICAL HEALTH: ON AVERAGE, HOW MANY DAYS PER WEEK DO YOU ENGAGE IN MODERATE TO STRENUOUS EXERCISE (LIKE A BRISK WALK)?: 0 DAYS

## 2023-05-11 SDOH — HEALTH STABILITY: PHYSICAL HEALTH: ON AVERAGE, HOW MANY MINUTES DO YOU ENGAGE IN EXERCISE AT THIS LEVEL?: 0 MIN

## 2023-05-11 SDOH — ECONOMIC STABILITY: TRANSPORTATION INSECURITY
IN THE PAST 12 MONTHS, HAS LACK OF TRANSPORTATION KEPT YOU FROM MEETINGS, WORK, OR FROM GETTING THINGS NEEDED FOR DAILY LIVING?: NO

## 2023-05-11 SDOH — ECONOMIC STABILITY: INCOME INSECURITY: IN THE LAST 12 MONTHS, WAS THERE A TIME WHEN YOU WERE NOT ABLE TO PAY THE MORTGAGE OR RENT ON TIME?: NO

## 2023-05-11 SDOH — ECONOMIC STABILITY: TRANSPORTATION INSECURITY
IN THE PAST 12 MONTHS, HAS THE LACK OF TRANSPORTATION KEPT YOU FROM MEDICAL APPOINTMENTS OR FROM GETTING MEDICATIONS?: NO

## 2023-05-11 SDOH — ECONOMIC STABILITY: HOUSING INSECURITY
IN THE LAST 12 MONTHS, WAS THERE A TIME WHEN YOU DID NOT HAVE A STEADY PLACE TO SLEEP OR SLEPT IN A SHELTER (INCLUDING NOW)?: NO

## 2023-05-11 SDOH — ECONOMIC STABILITY: HOUSING INSECURITY: IN THE LAST 12 MONTHS, HOW MANY PLACES HAVE YOU LIVED?: 1

## 2023-05-11 SDOH — ECONOMIC STABILITY: FOOD INSECURITY: WITHIN THE PAST 12 MONTHS, THE FOOD YOU BOUGHT JUST DIDN'T LAST AND YOU DIDN'T HAVE MONEY TO GET MORE.: NEVER TRUE

## 2023-05-11 SDOH — ECONOMIC STABILITY: FOOD INSECURITY: WITHIN THE PAST 12 MONTHS, YOU WORRIED THAT YOUR FOOD WOULD RUN OUT BEFORE YOU GOT MONEY TO BUY MORE.: NEVER TRUE

## 2023-05-11 ASSESSMENT — PATIENT HEALTH QUESTIONNAIRE - PHQ9
7. TROUBLE CONCENTRATING ON THINGS, SUCH AS READING THE NEWSPAPER OR WATCHING TELEVISION: 3
10. IF YOU CHECKED OFF ANY PROBLEMS, HOW DIFFICULT HAVE THESE PROBLEMS MADE IT FOR YOU TO DO YOUR WORK, TAKE CARE OF THINGS AT HOME, OR GET ALONG WITH OTHER PEOPLE: 1
3. TROUBLE FALLING OR STAYING ASLEEP: 1
9. THOUGHTS THAT YOU WOULD BE BETTER OFF DEAD, OR OF HURTING YOURSELF: 0
SUM OF ALL RESPONSES TO PHQ9 QUESTIONS 1 & 2: 2
2. FEELING DOWN, DEPRESSED OR HOPELESS: 1
5. POOR APPETITE OR OVEREATING: 0
SUM OF ALL RESPONSES TO PHQ QUESTIONS 1-9: 10
4. FEELING TIRED OR HAVING LITTLE ENERGY: 1
6. FEELING BAD ABOUT YOURSELF - OR THAT YOU ARE A FAILURE OR HAVE LET YOURSELF OR YOUR FAMILY DOWN: 0
8. MOVING OR SPEAKING SO SLOWLY THAT OTHER PEOPLE COULD HAVE NOTICED. OR THE OPPOSITE, BEING SO FIGETY OR RESTLESS THAT YOU HAVE BEEN MOVING AROUND A LOT MORE THAN USUAL: 3
SUM OF ALL RESPONSES TO PHQ QUESTIONS 1-9: 10
1. LITTLE INTEREST OR PLEASURE IN DOING THINGS: 1
SUM OF ALL RESPONSES TO PHQ QUESTIONS 1-9: 10
SUM OF ALL RESPONSES TO PHQ QUESTIONS 1-9: 10

## 2023-05-11 ASSESSMENT — SOCIAL DETERMINANTS OF HEALTH (SDOH)
WITHIN THE LAST YEAR, HAVE TO BEEN RAPED OR FORCED TO HAVE ANY KIND OF SEXUAL ACTIVITY BY YOUR PARTNER OR EX-PARTNER?: NO
IN A TYPICAL WEEK, HOW MANY TIMES DO YOU TALK ON THE PHONE WITH FAMILY, FRIENDS, OR NEIGHBORS?: MORE THAN THREE TIMES A WEEK
HOW OFTEN DO YOU ATTENT MEETINGS OF THE CLUB OR ORGANIZATION YOU BELONG TO?: NEVER
HOW OFTEN DO YOU GET TOGETHER WITH FRIENDS OR RELATIVES?: TWICE A WEEK
HOW OFTEN DO YOU ATTEND CHURCH OR RELIGIOUS SERVICES?: NEVER
WITHIN THE LAST YEAR, HAVE YOU BEEN HUMILIATED OR EMOTIONALLY ABUSED IN OTHER WAYS BY YOUR PARTNER OR EX-PARTNER?: NO
HOW HARD IS IT FOR YOU TO PAY FOR THE VERY BASICS LIKE FOOD, HOUSING, MEDICAL CARE, AND HEATING?: NOT HARD AT ALL
WITHIN THE LAST YEAR, HAVE YOU BEEN KICKED, HIT, SLAPPED, OR OTHERWISE PHYSICALLY HURT BY YOUR PARTNER OR EX-PARTNER?: NO
DO YOU BELONG TO ANY CLUBS OR ORGANIZATIONS SUCH AS CHURCH GROUPS UNIONS, FRATERNAL OR ATHLETIC GROUPS, OR SCHOOL GROUPS?: NO
WITHIN THE LAST YEAR, HAVE YOU BEEN AFRAID OF YOUR PARTNER OR EX-PARTNER?: NO

## 2023-05-11 ASSESSMENT — ANXIETY QUESTIONNAIRES
7. FEELING AFRAID AS IF SOMETHING AWFUL MIGHT HAPPEN: 2
IF YOU CHECKED OFF ANY PROBLEMS ON THIS QUESTIONNAIRE, HOW DIFFICULT HAVE THESE PROBLEMS MADE IT FOR YOU TO DO YOUR WORK, TAKE CARE OF THINGS AT HOME, OR GET ALONG WITH OTHER PEOPLE: SOMEWHAT DIFFICULT
5. BEING SO RESTLESS THAT IT IS HARD TO SIT STILL: 3
2. NOT BEING ABLE TO STOP OR CONTROL WORRYING: 3
4. TROUBLE RELAXING: 3
3. WORRYING TOO MUCH ABOUT DIFFERENT THINGS: 3
1. FEELING NERVOUS, ANXIOUS, OR ON EDGE: 2
6. BECOMING EASILY ANNOYED OR IRRITABLE: 3
GAD7 TOTAL SCORE: 19

## 2023-05-11 ASSESSMENT — LIFESTYLE VARIABLES
HOW OFTEN DO YOU HAVE A DRINK CONTAINING ALCOHOL: 2-3 TIMES A WEEK
HOW MANY STANDARD DRINKS CONTAINING ALCOHOL DO YOU HAVE ON A TYPICAL DAY: PATIENT DECLINED

## 2023-05-11 NOTE — PROGRESS NOTES
Chief Complaint   Patient presents with    Bleeding/Bruising     For months on legs    1. \"Have you been to the ER, urgent care clinic since your last visit? Hospitalized since your last visit? \" No    2. \"Have you seen or consulted any other health care providers outside of the [address] [place] [address] since your last visit? \" No     3. For patients aged 39-70: Has the patient had a colonoscopy / FIT/ Cologuard? NA - based on age     If the patient is female:    4. For patients aged 41-77: Has the patient had a mammogram within the past 2 years? NA - based on age    11. For patients aged 21-65: Has the patient had a pap smear? Yes - Care Gap present.  Rooming MA/LPN to request most recent results
Upper Arm, Position: Sitting, Cuff Size: Medium Adult)   Pulse 70   Temp 97.2 °F (36.2 °C) (Temporal)   Resp 18   Ht 5' 4\" (1.626 m)   Wt 166 lb (75.3 kg)   LMP 04/24/2023   SpO2 (!) 0%   BMI 28.49 kg/m²   Body mass index is 28.49 kg/m². General: Alert and oriented. No acute distress. Well nourished  HEENT :  Ears:TMs are normal. Canals are clear. Eyes: pupils equal, round, react to light and accommodation. Extra ocular movements intact. Nose: patent. Mouth and throat is clear. Neck:supple full range of motion no thyromegaly. Trachea midline, No carotid bruits. No significant lymphadenopathy  Lungs[de-identified] clear to auscultation without wheezes, rales, or rhonchi. Heart :RRR, S1 & S2 are normal intensity. No murmur; no gallop  Abdomen: bowel sounds active. No tenderness, guarding, rebound, masses, hepatic or spleen enlargement  Back: no CVA tenderness. Extremities: without clubbing, cyanosis, or edema  Pulses: radial and femoral pulses are normal  Neuro: HMF intact. Cranial nerves II through XII grossly normal.  Motor: is 5 over 5 and symmetrical.   Deep tendon reflexes: +2 equal  Integumentary; nickel size resolved bruises noted on both skins and right knee. Positive blanching. No petechiae  or bleeding noted gums, conjunctiva or sclera. Psych:appropriate behavior, mood, affect and judgement. Results for orders placed or performed in visit on 05/11/23   AMB POC PT/INR   Result Value Ref Range    Prothrombin time, POC 11.6     POC INR 1.0        Lab Results   Component Value Date    WBC 8.5 06/07/2021    HGB 11.8 06/07/2021    HCT 35.7 06/07/2021     06/07/2021    ALT 35 06/07/2021    AST 34 06/07/2021     06/07/2021    K 3.9 06/07/2021     (H) 06/07/2021    CREATININE 0.44 (L) 06/07/2021    BUN 8 06/07/2021    CO2 21 06/07/2021    INR 1.0 05/11/2023        Assessment/ Plan:     1. Bruising    - CBC;  Future  - AMB POC PT/INR  - RI COLLECTION VENOUS BLOOD VENIPUNCTURE  - CBC      Orders

## 2023-05-12 LAB
ERYTHROCYTE [DISTWIDTH] IN BLOOD BY AUTOMATED COUNT: 13.2 % (ref 11.5–14.5)
HCT VFR BLD AUTO: 42.7 % (ref 35–47)
HGB BLD-MCNC: 13.4 G/DL (ref 11.5–16)
MCH RBC QN AUTO: 30.9 PG (ref 26–34)
MCHC RBC AUTO-ENTMCNC: 31.4 G/DL (ref 30–36.5)
MCV RBC AUTO: 98.6 FL (ref 80–99)
NRBC # BLD: 0 K/UL (ref 0–0.01)
NRBC BLD-RTO: 0 PER 100 WBC
PLATELET # BLD AUTO: 162 K/UL (ref 150–400)
PMV BLD AUTO: 10.4 FL (ref 8.9–12.9)
RBC # BLD AUTO: 4.33 M/UL (ref 3.8–5.2)
WBC # BLD AUTO: 4.5 K/UL (ref 3.6–11)

## 2023-05-17 RX ORDER — NORETHINDRONE ACETATE AND ETHINYL ESTRADIOL 1MG-20(21)
1 KIT ORAL DAILY
COMMUNITY
Start: 2022-01-03 | End: 2023-06-02 | Stop reason: ALTCHOICE

## 2023-05-17 RX ORDER — QUETIAPINE FUMARATE 25 MG/1
1 TABLET, FILM COATED ORAL NIGHTLY
COMMUNITY
Start: 2023-03-24 | End: 2023-06-30

## 2023-05-17 RX ORDER — SERTRALINE HYDROCHLORIDE 100 MG/1
100 TABLET, FILM COATED ORAL DAILY
COMMUNITY
Start: 2022-03-11 | End: 2023-06-02 | Stop reason: SDUPTHER

## 2023-05-17 RX ORDER — IBUPROFEN 800 MG/1
800 TABLET ORAL EVERY 8 HOURS
COMMUNITY
Start: 2021-06-08

## 2023-05-31 ENCOUNTER — OFFICE VISIT (OUTPATIENT)
Age: 28
End: 2023-05-31
Payer: COMMERCIAL

## 2023-05-31 VITALS
SYSTOLIC BLOOD PRESSURE: 128 MMHG | HEART RATE: 103 BPM | HEIGHT: 64 IN | RESPIRATION RATE: 18 BRPM | BODY MASS INDEX: 28.68 KG/M2 | DIASTOLIC BLOOD PRESSURE: 88 MMHG | TEMPERATURE: 97.6 F | OXYGEN SATURATION: 98 % | WEIGHT: 168 LBS

## 2023-05-31 DIAGNOSIS — R68.89 FLU-LIKE SYMPTOMS: Primary | ICD-10-CM

## 2023-05-31 DIAGNOSIS — H10.31 ACUTE BACTERIAL CONJUNCTIVITIS OF RIGHT EYE: ICD-10-CM

## 2023-05-31 DIAGNOSIS — J02.9 SORE THROAT: ICD-10-CM

## 2023-05-31 LAB
INFLUENZA A ANTIGEN, POC: NEGATIVE
INFLUENZA B ANTIGEN, POC: NEGATIVE
STREP PYOGENES DNA, POC: NEGATIVE
VALID INTERNAL CONTROL, POC: YES
VALID INTERNAL CONTROL, POC: YES

## 2023-05-31 PROCEDURE — 3074F SYST BP LT 130 MM HG: CPT | Performed by: NURSE PRACTITIONER

## 2023-05-31 PROCEDURE — 3078F DIAST BP <80 MM HG: CPT | Performed by: NURSE PRACTITIONER

## 2023-05-31 PROCEDURE — 87651 STREP A DNA AMP PROBE: CPT | Performed by: NURSE PRACTITIONER

## 2023-05-31 PROCEDURE — 87502 INFLUENZA DNA AMP PROBE: CPT | Performed by: NURSE PRACTITIONER

## 2023-05-31 PROCEDURE — 99213 OFFICE O/P EST LOW 20 MIN: CPT | Performed by: NURSE PRACTITIONER

## 2023-05-31 RX ORDER — POLYMYXIN B SULFATE AND TRIMETHOPRIM 1; 10000 MG/ML; [USP'U]/ML
1 SOLUTION OPHTHALMIC EVERY 4 HOURS
Qty: 10 ML | Refills: 0 | Status: SHIPPED | OUTPATIENT
Start: 2023-05-31 | End: 2023-06-10

## 2023-05-31 ASSESSMENT — PATIENT HEALTH QUESTIONNAIRE - PHQ9: DEPRESSION UNABLE TO ASSESS: PT REFUSES

## 2023-06-02 ENCOUNTER — OFFICE VISIT (OUTPATIENT)
Age: 28
End: 2023-06-02
Payer: COMMERCIAL

## 2023-06-02 VITALS
SYSTOLIC BLOOD PRESSURE: 118 MMHG | DIASTOLIC BLOOD PRESSURE: 70 MMHG | RESPIRATION RATE: 18 BRPM | WEIGHT: 171.8 LBS | TEMPERATURE: 98.8 F | OXYGEN SATURATION: 97 % | HEART RATE: 65 BPM | HEIGHT: 64 IN | BODY MASS INDEX: 29.33 KG/M2

## 2023-06-02 DIAGNOSIS — J03.80 ACUTE BACTERIAL TONSILLITIS: Primary | ICD-10-CM

## 2023-06-02 DIAGNOSIS — B96.89 ACUTE BACTERIAL TONSILLITIS: Primary | ICD-10-CM

## 2023-06-02 PROBLEM — F41.1 GAD (GENERALIZED ANXIETY DISORDER): Status: ACTIVE | Noted: 2018-03-13

## 2023-06-02 PROCEDURE — 3078F DIAST BP <80 MM HG: CPT | Performed by: NURSE PRACTITIONER

## 2023-06-02 PROCEDURE — 99213 OFFICE O/P EST LOW 20 MIN: CPT | Performed by: NURSE PRACTITIONER

## 2023-06-02 PROCEDURE — 3074F SYST BP LT 130 MM HG: CPT | Performed by: NURSE PRACTITIONER

## 2023-06-02 RX ORDER — AMOXICILLIN 500 MG/1
500 CAPSULE ORAL 2 TIMES DAILY
Qty: 20 CAPSULE | Refills: 0 | Status: SHIPPED | OUTPATIENT
Start: 2023-06-02 | End: 2023-06-12

## 2023-06-02 SDOH — ECONOMIC STABILITY: FOOD INSECURITY: WITHIN THE PAST 12 MONTHS, YOU WORRIED THAT YOUR FOOD WOULD RUN OUT BEFORE YOU GOT MONEY TO BUY MORE.: NEVER TRUE

## 2023-06-02 SDOH — ECONOMIC STABILITY: INCOME INSECURITY: HOW HARD IS IT FOR YOU TO PAY FOR THE VERY BASICS LIKE FOOD, HOUSING, MEDICAL CARE, AND HEATING?: NOT HARD AT ALL

## 2023-06-02 SDOH — ECONOMIC STABILITY: FOOD INSECURITY: WITHIN THE PAST 12 MONTHS, THE FOOD YOU BOUGHT JUST DIDN'T LAST AND YOU DIDN'T HAVE MONEY TO GET MORE.: NEVER TRUE

## 2023-06-02 ASSESSMENT — PATIENT HEALTH QUESTIONNAIRE - PHQ9
SUM OF ALL RESPONSES TO PHQ QUESTIONS 1-9: 0
2. FEELING DOWN, DEPRESSED OR HOPELESS: 0
SUM OF ALL RESPONSES TO PHQ QUESTIONS 1-9: 0
SUM OF ALL RESPONSES TO PHQ QUESTIONS 1-9: 0
SUM OF ALL RESPONSES TO PHQ9 QUESTIONS 1 & 2: 0
1. LITTLE INTEREST OR PLEASURE IN DOING THINGS: 0
SUM OF ALL RESPONSES TO PHQ QUESTIONS 1-9: 0

## 2023-06-02 NOTE — PROGRESS NOTES
Chief Complaint   Patient presents with    Pharyngitis    Cough     C/o cough w/ production x 1 month off and on . .. not taking any otc medications . .. swabbed for strep and flu on Wed both negative          HPI:      Prasanna Bustos is a 33 y.o. female. New Issues:  She has been sick off and on for 1 month. C/o sinus drainage, cough and irritated eyes. Low grade fever today. Was seen by another NP and given Polytrim for her eyes. She has not taken anything OTC as she has recently taken a UPT which was positive. No Known Allergies    Current Outpatient Medications   Medication Sig Dispense Refill    trimethoprim-polymyxin b (POLYTRIM) 82485-7.1 UNIT/ML-% ophthalmic solution Place 1 drop into the right eye every 4 hours for 10 days 10 mL 0    ibuprofen (ADVIL;MOTRIN) 800 MG tablet Take 1 tablet by mouth in the morning and 1 tablet at noon and 1 tablet in the evening. QUEtiapine (SEROQUEL) 25 MG tablet Take 1 tablet by mouth nightly      norethindrone-ethinyl estradiol (JUNEL FE 1/20) 1-20 MG-MCG per tablet Take 1 tablet by mouth daily      lamoTRIgine (LAMICTAL) 200 MG tablet Take 1 tablet by mouth daily 1.5 tablet daily      sertraline (ZOLOFT) 100 MG tablet Take 1 tablet by mouth daily       No current facility-administered medications for this visit.         Past Medical History:   Diagnosis Date    Essential hypertension     Psychiatric problem     Anxietydepression       Past Surgical History:   Procedure Laterality Date    APPENDECTOMY Right 09/2022       Social History     Socioeconomic History    Marital status:      Spouse name: None    Number of children: None    Years of education: None    Highest education level: None   Tobacco Use    Smoking status: Never     Passive exposure: Never    Smokeless tobacco: Never   Vaping Use    Vaping Use: Never used   Substance and Sexual Activity    Alcohol use: Yes    Drug use: Never    Sexual activity: Yes     Partners: Male     Social

## 2023-06-02 NOTE — PROGRESS NOTES
Subjective:      Fernando Milian is a 33 y.o. female who presents for evaluation of erythema and tearing in the right eye. She has noticed the above symptoms for 1 day. Onset was acute. Patient denies photophobia and visual field deficit. There is no significant history of  allergies, trauma or injury . Flu like symptoms- she endorses sore throat and body aches x 1 day. Past Medical History:   Diagnosis Date    Essential hypertension     Psychiatric problem     Anxietydepression     Patient Active Problem List    Diagnosis Date Noted    Intrauterine pregnancy 06/07/2021    Benign essential HTN, chronic, antepartum, third trimester 06/07/2021    Chronic hypertension with exacerbation during pregnancy in third trimester 05/15/2019    Generalized anxiety disorder 03/13/2018    Essential hypertension 03/13/2018     Past Surgical History:   Procedure Laterality Date    APPENDECTOMY Right 09/2022     History reviewed. No pertinent family history. Social History     Socioeconomic History    Marital status:      Spouse name: None    Number of children: None    Years of education: None    Highest education level: None   Tobacco Use    Smoking status: Never     Passive exposure: Never    Smokeless tobacco: Never   Vaping Use    Vaping Use: Never used   Substance and Sexual Activity    Alcohol use: Yes    Drug use: Never    Sexual activity: Yes     Partners: Male     Social Determinants of Health     Financial Resource Strain: Low Risk     Difficulty of Paying Living Expenses: Not hard at all   Food Insecurity: No Food Insecurity    Worried About Running Out of Food in the Last Year: Never true    Ran Out of Food in the Last Year: Never true   Transportation Needs: No Transportation Needs    Lack of Transportation (Medical): No    Lack of Transportation (Non-Medical):  No   Physical Activity: Inactive    Days of Exercise per Week: 0 days    Minutes of Exercise per Session: 0 min   Social Connections: Socially

## 2023-06-02 NOTE — PROGRESS NOTES
Ariel Betancur is a 33 y.o. female presenting for/with:    Chief Complaint   Patient presents with    Pharyngitis    Cough     C/o cough w/ production x 1 month off and on . .. not taking any otc medications . .. swabbed for strep and flu on Wed both negative        Vitals:    06/02/23 1417   BP: 118/70   Site: Left Upper Arm   Position: Sitting   Pulse: 65   Resp: 18   Temp: 98.8 °F (37.1 °C)   TempSrc: Temporal   SpO2: 97%   Weight: 171 lb 12.8 oz (77.9 kg)   Height: 5' 4\" (1.626 m)       Pain Scale: 0 - No pain/10  Pain Location:     1. \"Have you been to the ER, urgent care clinic since your last visit? Hospitalized since your last visit? \" No    2. \"Have you seen or consulted any other health care providers outside of the [address] [place] [address] since your last visit? \" No     3. For patients aged 39-70: Has the patient had a colonoscopy / FIT/ Cologuard? NA - based on age     If the patient is female:    4. For patients aged 41-77: Has the patient had a mammogram within the past 2 years? NA - based on age    11. For patients aged 21-65: Has the patient had a pap smear? Yes - Care Gap present.  Most recent result on file      PHQ-9  6/2/2023   Depression Unable to Assess -   Little interest or pleasure in doing things 0   Little interest or pleasure in doing things -   Feeling down, depressed, or hopeless 0   Trouble falling or staying asleep, or sleeping too much -   Trouble falling or staying asleep, or sleeping too much -   Feeling tired or having little energy -   Feeling tired or having little energy -   Poor appetite or overeating -   Poor appetite, weight loss, or overeating -   Feeling bad about yourself - or that you are a failure or have let yourself or your family down -   Feeling bad about yourself - or that you are a failure or have let yourself or your family down -   Trouble concentrating on things, such as reading the newspaper or watching television -   Trouble concentrating on things such as

## 2023-06-30 DIAGNOSIS — F39 UNSPECIFIED MOOD (AFFECTIVE) DISORDER (HCC): ICD-10-CM

## 2023-06-30 RX ORDER — QUETIAPINE FUMARATE 25 MG/1
TABLET, FILM COATED ORAL NIGHTLY
Qty: 90 TABLET | Refills: 0 | Status: SHIPPED | OUTPATIENT
Start: 2023-06-30

## 2023-08-30 ENCOUNTER — OFFICE VISIT (OUTPATIENT)
Age: 28
End: 2023-08-30
Payer: COMMERCIAL

## 2023-08-30 VITALS
SYSTOLIC BLOOD PRESSURE: 102 MMHG | OXYGEN SATURATION: 98 % | DIASTOLIC BLOOD PRESSURE: 70 MMHG | HEART RATE: 83 BPM | HEIGHT: 64 IN | TEMPERATURE: 98.1 F | RESPIRATION RATE: 20 BRPM | BODY MASS INDEX: 30.8 KG/M2 | WEIGHT: 180.4 LBS

## 2023-08-30 DIAGNOSIS — R68.89 FLU-LIKE SYMPTOMS: Primary | ICD-10-CM

## 2023-08-30 LAB
EXP DATE SOLUTION: NORMAL
EXP DATE SWAB: NORMAL
EXPIRATION DATE: NORMAL
INFLUENZA A ANTIGEN, POC: NEGATIVE
INFLUENZA B ANTIGEN, POC: NEGATIVE
LOT NUMBER POC: NORMAL
LOT NUMBER SOLUTION: NORMAL
LOT NUMBER SWAB: NORMAL
SARS-COV-2 RNA, POC: NEGATIVE
VALID INTERNAL CONTROL, POC: YES

## 2023-08-30 PROCEDURE — 99213 OFFICE O/P EST LOW 20 MIN: CPT

## 2023-08-30 PROCEDURE — 3078F DIAST BP <80 MM HG: CPT

## 2023-08-30 PROCEDURE — 87804 INFLUENZA ASSAY W/OPTIC: CPT

## 2023-08-30 PROCEDURE — 3074F SYST BP LT 130 MM HG: CPT

## 2023-08-30 PROCEDURE — 87635 SARS-COV-2 COVID-19 AMP PRB: CPT

## 2023-08-30 ASSESSMENT — PATIENT HEALTH QUESTIONNAIRE - PHQ9
SUM OF ALL RESPONSES TO PHQ9 QUESTIONS 1 & 2: 2
SUM OF ALL RESPONSES TO PHQ QUESTIONS 1-9: 2
SUM OF ALL RESPONSES TO PHQ QUESTIONS 1-9: 2
1. LITTLE INTEREST OR PLEASURE IN DOING THINGS: 1
2. FEELING DOWN, DEPRESSED OR HOPELESS: 1
SUM OF ALL RESPONSES TO PHQ QUESTIONS 1-9: 2
SUM OF ALL RESPONSES TO PHQ QUESTIONS 1-9: 2

## 2023-08-30 ASSESSMENT — ENCOUNTER SYMPTOMS
SHORTNESS OF BREATH: 0
WHEEZING: 0
TROUBLE SWALLOWING: 0
SORE THROAT: 0
COUGH: 0

## 2023-09-08 ENCOUNTER — OFFICE VISIT (OUTPATIENT)
Age: 28
End: 2023-09-08
Payer: COMMERCIAL

## 2023-09-08 ENCOUNTER — NURSE ONLY (OUTPATIENT)
Age: 28
End: 2023-09-08
Payer: COMMERCIAL

## 2023-09-08 VITALS
RESPIRATION RATE: 18 BRPM | OXYGEN SATURATION: 99 % | WEIGHT: 181.4 LBS | BODY MASS INDEX: 30.97 KG/M2 | TEMPERATURE: 98 F | SYSTOLIC BLOOD PRESSURE: 114 MMHG | HEIGHT: 64 IN | HEART RATE: 87 BPM | DIASTOLIC BLOOD PRESSURE: 84 MMHG

## 2023-09-08 DIAGNOSIS — A49.9 BACTERIAL UTI: Primary | ICD-10-CM

## 2023-09-08 DIAGNOSIS — R35.0 URINE FREQUENCY: Primary | ICD-10-CM

## 2023-09-08 DIAGNOSIS — N39.0 BACTERIAL UTI: Primary | ICD-10-CM

## 2023-09-08 LAB
BILIRUBIN, URINE, POC: NEGATIVE
BLOOD URINE, POC: ABNORMAL
GLUCOSE URINE, POC: NEGATIVE
KETONES, URINE, POC: NEGATIVE
LEUKOCYTE ESTERASE, URINE, POC: ABNORMAL
NITRITE, URINE, POC: POSITIVE
PH, URINE, POC: 7 (ref 4.6–8)
PROTEIN,URINE, POC: NEGATIVE
SPECIFIC GRAVITY, URINE, POC: 1.01 (ref 1–1.03)
URINALYSIS CLARITY, POC: CLEAR
URINALYSIS COLOR, POC: YELLOW
UROBILINOGEN, POC: NORMAL

## 2023-09-08 PROCEDURE — 3079F DIAST BP 80-89 MM HG: CPT | Performed by: NURSE PRACTITIONER

## 2023-09-08 PROCEDURE — 81002 URINALYSIS NONAUTO W/O SCOPE: CPT

## 2023-09-08 PROCEDURE — 3074F SYST BP LT 130 MM HG: CPT | Performed by: NURSE PRACTITIONER

## 2023-09-08 PROCEDURE — 99213 OFFICE O/P EST LOW 20 MIN: CPT | Performed by: NURSE PRACTITIONER

## 2023-09-08 RX ORDER — NITROFURANTOIN 25; 75 MG/1; MG/1
100 CAPSULE ORAL 2 TIMES DAILY
Qty: 10 CAPSULE | Refills: 0 | Status: SHIPPED | OUTPATIENT
Start: 2023-09-08 | End: 2023-09-13

## 2023-09-08 ASSESSMENT — PATIENT HEALTH QUESTIONNAIRE - PHQ9
2. FEELING DOWN, DEPRESSED OR HOPELESS: 0
1. LITTLE INTEREST OR PLEASURE IN DOING THINGS: 0
SUM OF ALL RESPONSES TO PHQ9 QUESTIONS 1 & 2: 0
SUM OF ALL RESPONSES TO PHQ QUESTIONS 1-9: 0

## 2023-09-08 NOTE — PROGRESS NOTES
Chief Complaint   Patient presents with    Urinary Frequency     Pt reports having urinary frequency for the past 2 days. HPI:      Katharine Leo is a 33 y.o. female. New Issues:  she has had urinary frequency for the past 2 days. Denies hematuria. Mild back pain. No fevers or chills. UA shows large blood, POS nitrites and large leukocytes. No Known Allergies    Current Outpatient Medications   Medication Sig Dispense Refill    QUEtiapine (SEROQUEL) 25 MG tablet TAKE 1 TABLET BY MOUTH NIGHTLY 90 tablet 0    norethindrone-ethinyl estradiol (JUNEL FE 1/20) 1-20 MG-MCG per tablet Take 1 tablet by mouth daily      lamoTRIgine (LAMICTAL) 200 MG tablet Take 1 tablet by mouth daily 1.5 tablet daily      sertraline (ZOLOFT) 100 MG tablet Take 1 tablet by mouth daily      Misc. Devices KIT Nature's Blend Prenatal Multivitamin with Minerals [facility] Baby Box)  [address]: [address]-02; Take 1 softgel by mouth daily or as instructed by provider;  #qs for 6 month (Patient not taking: Reported on 8/30/2023) 1 kit 1    ibuprofen (ADVIL;MOTRIN) 800 MG tablet Take 1 tablet by mouth in the morning and 1 tablet at noon and 1 tablet in the evening. (Patient not taking: Reported on 8/30/2023)       No current facility-administered medications for this visit.         Past Medical History:   Diagnosis Date    Essential hypertension     Psychiatric problem     Anxietydepression       Past Surgical History:   Procedure Laterality Date    APPENDECTOMY Right 09/2022       Social History     Socioeconomic History    Marital status:      Spouse name: None    Number of children: None    Years of education: None    Highest education level: None   Tobacco Use    Smoking status: Never     Passive exposure: Never    Smokeless tobacco: Never   Vaping Use    Vaping Use: Never used   Substance and Sexual Activity    Alcohol use: Yes    Drug use: Never    Sexual activity: Yes     Partners: Male     Social Determinants of Health

## 2023-09-27 ENCOUNTER — APPOINTMENT (OUTPATIENT)
Facility: HOSPITAL | Age: 28
End: 2023-09-27
Payer: COMMERCIAL

## 2023-09-27 ENCOUNTER — HOSPITAL ENCOUNTER (EMERGENCY)
Facility: HOSPITAL | Age: 28
Discharge: HOME | End: 2023-09-27
Attending: EMERGENCY MEDICINE
Payer: COMMERCIAL

## 2023-09-27 VITALS
RESPIRATION RATE: 16 BRPM | OXYGEN SATURATION: 98 % | DIASTOLIC BLOOD PRESSURE: 73 MMHG | HEART RATE: 93 BPM | HEIGHT: 64 IN | TEMPERATURE: 98.5 F | WEIGHT: 176 LBS | BODY MASS INDEX: 30.05 KG/M2 | SYSTOLIC BLOOD PRESSURE: 112 MMHG

## 2023-09-27 DIAGNOSIS — J20.9 ACUTE BRONCHITIS, UNSPECIFIED ORGANISM: Primary | ICD-10-CM

## 2023-09-27 PROCEDURE — 71045 X-RAY EXAM CHEST 1 VIEW: CPT

## 2023-09-27 PROCEDURE — 99283 EMERGENCY DEPT VISIT LOW MDM: CPT

## 2023-09-27 RX ORDER — AZITHROMYCIN 250 MG/1
TABLET, FILM COATED ORAL
Qty: 1 PACKET | Refills: 0 | Status: SHIPPED | OUTPATIENT
Start: 2023-09-27 | End: 2023-10-01

## 2023-09-27 RX ORDER — METHYLPREDNISOLONE 4 MG/1
TABLET ORAL
Qty: 1 KIT | Refills: 0 | Status: SHIPPED | OUTPATIENT
Start: 2023-09-27 | End: 2023-10-03

## 2023-09-27 ASSESSMENT — ENCOUNTER SYMPTOMS
ABDOMINAL PAIN: 0
COUGH: 1
EYE REDNESS: 0
VOMITING: 0
DIARRHEA: 0
SORE THROAT: 1
NAUSEA: 0
SHORTNESS OF BREATH: 0

## 2023-09-27 ASSESSMENT — PAIN - FUNCTIONAL ASSESSMENT: PAIN_FUNCTIONAL_ASSESSMENT: NONE - DENIES PAIN

## 2023-09-27 ASSESSMENT — PATIENT HEALTH QUESTIONNAIRE - PHQ9
SUM OF ALL RESPONSES TO PHQ9 QUESTIONS 1 & 2: 0
SUM OF ALL RESPONSES TO PHQ QUESTIONS 1-9: 0
SUM OF ALL RESPONSES TO PHQ QUESTIONS 1-9: 0
1. LITTLE INTEREST OR PLEASURE IN DOING THINGS: 0
2. FEELING DOWN, DEPRESSED OR HOPELESS: 0
SUM OF ALL RESPONSES TO PHQ QUESTIONS 1-9: 0
SUM OF ALL RESPONSES TO PHQ QUESTIONS 1-9: 0

## 2023-09-27 ASSESSMENT — LIFESTYLE VARIABLES
HOW MANY STANDARD DRINKS CONTAINING ALCOHOL DO YOU HAVE ON A TYPICAL DAY: PATIENT DOES NOT DRINK
HOW OFTEN DO YOU HAVE A DRINK CONTAINING ALCOHOL: NEVER

## 2023-09-27 NOTE — ED PROVIDER NOTES
EMERGENCY DEPARTMENT HISTORY AND PHYSICAL EXAM      Date: 9/27/2023  Patient Name: Jose Wilkinson    History of Presenting Illness     Chief Complaint   Patient presents with    Cough       History Provided By: Patient    HPI: Jose Wilkinson, 33 y.o. female with past medical history listed below, presents via private vehicle to the ED with cc of cough. Patient reports a progressively worsening cough over the past 2 weeks. Cough is productive of yellow and green sputum. No fevers or chills. She did have some sweats on several evenings. No weight loss. No history of hemoptysis. She also has a mild sore throat but she feels like that is from coughing. He is otherwise healthy. Non-smoker but states she vapes socially on the weekends. There are no other complaints, changes, or physical findings at this time. PCP: DANTE Slade NP    No current facility-administered medications on file prior to encounter. Current Outpatient Medications on File Prior to Encounter   Medication Sig Dispense Refill    QUEtiapine (SEROQUEL) 25 MG tablet TAKE 1 TABLET BY MOUTH NIGHTLY 90 tablet 0    Misc. Devices KIT Nature's Blend Prenatal Multivitamin with Minerals [facility] [place] Baby [facility])  [address]: [address]-52; Take 1 softgel by mouth daily or as instructed by provider;  #qs for 6 month (Patient not taking: Reported on 8/30/2023) 1 kit 1    ibuprofen (ADVIL;MOTRIN) 800 MG tablet Take 1 tablet by mouth in the morning and 1 tablet at noon and 1 tablet in the evening.  (Patient not taking: Reported on 8/30/2023)      norethindrone-ethinyl estradiol (Albino Cordoba FE 1/20) 1-20 MG-MCG per tablet Take 1 tablet by mouth daily      lamoTRIgine (LAMICTAL) 200 MG tablet Take 1 tablet by mouth daily 1.5 tablet daily      sertraline (ZOLOFT) 100 MG tablet Take 1 tablet by mouth daily         Past History     Past Medical History:  Past Medical History:   Diagnosis Date    Essential hypertension     Psychiatric problem

## 2023-09-27 NOTE — ED NOTES
Instructions provided to patient in regards to discharge. Patient verbalized understanding. No questions at time of discharge. Ambulated out without any difficulty. Vital signs stable at time of discharge.       Nydia Diaz RN  09/27/23 [id number]

## 2023-09-28 ENCOUNTER — CARE COORDINATION (OUTPATIENT)
Dept: OTHER | Facility: CLINIC | Age: 28
End: 2023-09-28

## 2023-09-28 NOTE — CARE COORDINATION
Ambulatory Care Coordination Note    LPN CC attempted to reach patient for introduction to Associate Care Management related to ER visit on 9/27/23. No answer, recorded message stating number not in service. Plan for follow-up call in 1-2 days      No future appointments.

## 2023-09-29 ENCOUNTER — CARE COORDINATION (OUTPATIENT)
Dept: OTHER | Facility: CLINIC | Age: 28
End: 2023-09-29

## 2023-09-29 NOTE — CARE COORDINATION
Ambulatory Care Coordination Note    LPN CC attempted 2nd outreach to patient for introduction to Associate Care Management related to ER visit. No answer and no VM, unable to leave message. Unable to Reach Letter sent to patient via chart. Will continue to outreach. No future appointments.

## 2023-10-07 DIAGNOSIS — F39 UNSPECIFIED MOOD (AFFECTIVE) DISORDER (HCC): ICD-10-CM

## 2023-10-09 RX ORDER — QUETIAPINE FUMARATE 25 MG/1
25 TABLET, FILM COATED ORAL
Qty: 90 TABLET | Refills: 0 | Status: SHIPPED | OUTPATIENT
Start: 2023-10-09

## 2023-10-19 ENCOUNTER — CARE COORDINATION (OUTPATIENT)
Dept: OTHER | Facility: CLINIC | Age: 28
End: 2023-10-19

## 2023-10-19 NOTE — CARE COORDINATION
Ambulatory Care Coordination Note    LPN CC attempted third and final call to patient for introduction to Associate Care Management. No answer. Final Unable to Reach Letter sent to patient via chart. No further outreach scheduled with this LPN CC, patient has been provided with this LPN CC's contact information. ACM will sign off care team at this time. No future appointments.

## 2024-01-19 LAB
ABO, EXTERNAL RESULT: NORMAL
C. TRACHOMATIS, EXTERNAL RESULT: NEGATIVE
HEP B, EXTERNAL RESULT: NEGATIVE
HEPATITIS C ANTIBODY, EXTERNAL RESULT: NON REACTIVE
HIV, EXTERNAL RESULT: NON REACTIVE
N. GONORRHOEAE, EXTERNAL RESULT: NEGATIVE
RUBELLA TITER, EXTERNAL RESULT: NORMAL
T. PALLIDUM (SYPHILIS) ANTIBODY, EXTERNAL RESULT: NON REACTIVE

## 2024-08-03 LAB — GBS, EXTERNAL RESULT: NEGATIVE

## 2024-08-05 ENCOUNTER — HOSPITAL ENCOUNTER (INPATIENT)
Facility: HOSPITAL | Age: 29
LOS: 2 days | Discharge: HOME | DRG: 560 | End: 2024-08-07
Attending: STUDENT IN AN ORGANIZED HEALTH CARE EDUCATION/TRAINING PROGRAM | Admitting: STUDENT IN AN ORGANIZED HEALTH CARE EDUCATION/TRAINING PROGRAM
Payer: COMMERCIAL

## 2024-08-05 PROBLEM — O36.5990 FETAL GROWTH RESTRICTION ANTEPARTUM: Status: ACTIVE | Noted: 2024-08-05

## 2024-08-05 LAB
ABO + RH BLD: NORMAL
AMPHET UR QL SCN: NEGATIVE
BARBITURATES UR QL SCN: NEGATIVE
BASOPHILS # BLD: 0 K/UL (ref 0–0.1)
BASOPHILS NFR BLD: 0 % (ref 0–1)
BENZODIAZ UR QL: NEGATIVE
BLOOD GROUP ANTIBODIES SERPL: NORMAL
CANNABINOIDS UR QL SCN: NEGATIVE
COCAINE UR QL SCN: NEGATIVE
DIFFERENTIAL METHOD BLD: ABNORMAL
EOSINOPHIL # BLD: 0.2 K/UL (ref 0–0.4)
EOSINOPHIL NFR BLD: 3 % (ref 0–7)
ERYTHROCYTE [DISTWIDTH] IN BLOOD BY AUTOMATED COUNT: 12.5 % (ref 11.5–14.5)
HCT VFR BLD AUTO: 35.4 % (ref 35–47)
HGB BLD-MCNC: 11.8 G/DL (ref 11.5–16)
IMM GRANULOCYTES # BLD AUTO: 0.1 K/UL (ref 0–0.04)
IMM GRANULOCYTES NFR BLD AUTO: 1 % (ref 0–0.5)
LYMPHOCYTES # BLD: 2 K/UL (ref 0.8–3.5)
LYMPHOCYTES NFR BLD: 21 % (ref 12–49)
Lab: NORMAL
MCH RBC QN AUTO: 30.7 PG (ref 26–34)
MCHC RBC AUTO-ENTMCNC: 33.3 G/DL (ref 30–36.5)
MCV RBC AUTO: 92.2 FL (ref 80–99)
METHADONE UR QL: NEGATIVE
MONOCYTES # BLD: 0.5 K/UL (ref 0–1)
MONOCYTES NFR BLD: 5 % (ref 5–13)
NEUTS SEG # BLD: 6.9 K/UL (ref 1.8–8)
NEUTS SEG NFR BLD: 70 % (ref 32–75)
NRBC # BLD: 0 K/UL (ref 0–0.01)
NRBC BLD-RTO: 0 PER 100 WBC
OPIATES UR QL: NEGATIVE
PCP UR QL: NEGATIVE
PLATELET # BLD AUTO: 225 K/UL (ref 150–400)
PMV BLD AUTO: 11.6 FL (ref 8.9–12.9)
RBC # BLD AUTO: 3.84 M/UL (ref 3.8–5.2)
SPECIMEN EXP DATE BLD: NORMAL
WBC # BLD AUTO: 9.8 K/UL (ref 3.6–11)

## 2024-08-05 PROCEDURE — 6370000000 HC RX 637 (ALT 250 FOR IP): Performed by: STUDENT IN AN ORGANIZED HEALTH CARE EDUCATION/TRAINING PROGRAM

## 2024-08-05 PROCEDURE — 1120000000 HC RM PRIVATE OB

## 2024-08-05 PROCEDURE — 86592 SYPHILIS TEST NON-TREP QUAL: CPT

## 2024-08-05 PROCEDURE — 86900 BLOOD TYPING SEROLOGIC ABO: CPT

## 2024-08-05 PROCEDURE — 80307 DRUG TEST PRSMV CHEM ANLYZR: CPT

## 2024-08-05 PROCEDURE — 36415 COLL VENOUS BLD VENIPUNCTURE: CPT

## 2024-08-05 PROCEDURE — 85025 COMPLETE CBC W/AUTO DIFF WBC: CPT

## 2024-08-05 PROCEDURE — 86901 BLOOD TYPING SEROLOGIC RH(D): CPT

## 2024-08-05 PROCEDURE — 86850 RBC ANTIBODY SCREEN: CPT

## 2024-08-05 RX ORDER — METHYLERGONOVINE MALEATE 0.2 MG/ML
200 INJECTION INTRAVENOUS PRN
Status: DISCONTINUED | OUTPATIENT
Start: 2024-08-05 | End: 2024-08-06

## 2024-08-05 RX ORDER — SODIUM CHLORIDE, SODIUM LACTATE, POTASSIUM CHLORIDE, CALCIUM CHLORIDE 600; 310; 30; 20 MG/100ML; MG/100ML; MG/100ML; MG/100ML
INJECTION, SOLUTION INTRAVENOUS CONTINUOUS
Status: DISCONTINUED | OUTPATIENT
Start: 2024-08-05 | End: 2024-08-06

## 2024-08-05 RX ORDER — MISOPROSTOL 200 UG/1
400 TABLET ORAL PRN
Status: DISCONTINUED | OUTPATIENT
Start: 2024-08-05 | End: 2024-08-06

## 2024-08-05 RX ORDER — SODIUM CHLORIDE 9 MG/ML
25 INJECTION, SOLUTION INTRAVENOUS PRN
Status: DISCONTINUED | OUTPATIENT
Start: 2024-08-05 | End: 2024-08-06

## 2024-08-05 RX ORDER — CARBOPROST TROMETHAMINE 250 UG/ML
250 INJECTION, SOLUTION INTRAMUSCULAR PRN
Status: DISCONTINUED | OUTPATIENT
Start: 2024-08-05 | End: 2024-08-06

## 2024-08-05 RX ORDER — ONDANSETRON 4 MG/1
4 TABLET, ORALLY DISINTEGRATING ORAL EVERY 6 HOURS PRN
Status: DISCONTINUED | OUTPATIENT
Start: 2024-08-05 | End: 2024-08-06

## 2024-08-05 RX ORDER — DOCUSATE SODIUM 100 MG/1
100 CAPSULE, LIQUID FILLED ORAL 2 TIMES DAILY
Status: DISCONTINUED | OUTPATIENT
Start: 2024-08-05 | End: 2024-08-06

## 2024-08-05 RX ORDER — SODIUM CHLORIDE 0.9 % (FLUSH) 0.9 %
5-40 SYRINGE (ML) INJECTION EVERY 12 HOURS SCHEDULED
Status: DISCONTINUED | OUTPATIENT
Start: 2024-08-05 | End: 2024-08-06

## 2024-08-05 RX ORDER — ONDANSETRON 2 MG/ML
4 INJECTION INTRAMUSCULAR; INTRAVENOUS EVERY 6 HOURS PRN
Status: DISCONTINUED | OUTPATIENT
Start: 2024-08-05 | End: 2024-08-06

## 2024-08-05 RX ORDER — TERBUTALINE SULFATE 1 MG/ML
0.25 INJECTION, SOLUTION SUBCUTANEOUS
Status: COMPLETED | OUTPATIENT
Start: 2024-08-05 | End: 2024-08-06

## 2024-08-05 RX ORDER — SODIUM CHLORIDE 0.9 % (FLUSH) 0.9 %
5-40 SYRINGE (ML) INJECTION PRN
Status: DISCONTINUED | OUTPATIENT
Start: 2024-08-05 | End: 2024-08-06

## 2024-08-05 RX ORDER — SODIUM CHLORIDE, SODIUM LACTATE, POTASSIUM CHLORIDE, AND CALCIUM CHLORIDE .6; .31; .03; .02 G/100ML; G/100ML; G/100ML; G/100ML
500 INJECTION, SOLUTION INTRAVENOUS PRN
Status: DISCONTINUED | OUTPATIENT
Start: 2024-08-05 | End: 2024-08-06

## 2024-08-05 RX ADMIN — Medication 25 MCG: at 21:29

## 2024-08-05 NOTE — LACTATION NOTE
Discussed with mother her plan for feeding.  Reviewed the benefits of exclusive breast milk feeding during the hospital stay.  Informed mother of the risks of using formula to supplement in the first few days of life as well as the benefits of successful breast milk feeding. Mother acknowledges understanding of information reviewed and states that it is her plan to formula feed exclusively her infant.  Will support her choice and offer additional information as needed.

## 2024-08-05 NOTE — H&P
Admission History and Physical    CC: IOL for FGR    HPI: Nadya Lua is a 28 y.o.  at 37w1d presenting for IOL for FGR with EFW in 3.2%ile. Patient reports good FM, denies ctx, LoF or VB.      Prenatal record reviewed from 24-24    Pregnancy was complicated by:   -Bipolar  -History of shoulder dystocia in G1, no reoccurrence in G2, desires vaginal birth   -CHTN- no meds  -FGR- 3.2%ile       Past Medical History:   Diagnosis Date    Essential hypertension     Psychiatric problem     Anxietydepression       Past Surgical History:   Procedure Laterality Date    APPENDECTOMY Right 2022       OB History    Para Term  AB Living   5 2 2   2 2   SAB IAB Ectopic Molar Multiple Live Births   2         2      # Outcome Date GA Lbr Ty/2nd Weight Sex Delivery Anes PTL Lv   5 Current            4 2023           3 Term  38w5d    Vag-Spont   ANASTASIYA   2 SAB            1 Term 2019     Vag-Vacuum   ANASTASIYA      Complications: Shoulder Dystocia       History reviewed. No pertinent family history.    No current facility-administered medications on file prior to encounter.     Current Outpatient Medications on File Prior to Encounter   Medication Sig Dispense Refill    QUEtiapine (SEROQUEL) 25 MG tablet TAKE 1 TABLET BY MOUTH EVERY DAY AT NIGHT (Patient not taking: Reported on 2024) 90 tablet 0    Misc. Devices KIT Nature's Blend Prenatal Multivitamin with Minerals ([facility] Baby Box)  [facility]: [address]-49;  Take 1 softgel by mouth daily or as instructed by provider;  #qs for 6 month (Patient not taking: Reported on 2023) 1 kit 1    ibuprofen (ADVIL;MOTRIN) 800 MG tablet Take 1 tablet by mouth in the morning and 1 tablet at noon and 1 tablet in the evening. (Patient not taking: Reported on 2023)      norethindrone-ethinyl estradiol (JUNEL ) 1-20 MG-MCG per tablet Take 1 tablet by mouth daily (Patient not taking: Reported on 2024)      lamoTRIgine (LAMICTAL) 200 MG tablet Take

## 2024-08-06 ENCOUNTER — ANESTHESIA (OUTPATIENT)
Dept: LABOR AND DELIVERY | Facility: HOSPITAL | Age: 29
DRG: 560 | End: 2024-08-06
Payer: COMMERCIAL

## 2024-08-06 LAB — RPR SER QL: NONREACTIVE

## 2024-08-06 PROCEDURE — 6370000000 HC RX 637 (ALT 250 FOR IP): Performed by: STUDENT IN AN ORGANIZED HEALTH CARE EDUCATION/TRAINING PROGRAM

## 2024-08-06 PROCEDURE — 62325 NJX INTERLAMINAR CRV/THRC: CPT | Performed by: ANESTHESIOLOGY

## 2024-08-06 PROCEDURE — 4A1HXCZ MONITORING OF PRODUCTS OF CONCEPTION, CARDIAC RATE, EXTERNAL APPROACH: ICD-10-PCS | Performed by: OBSTETRICS & GYNECOLOGY

## 2024-08-06 PROCEDURE — 3700000156 HC EPIDURAL ANESTHESIA

## 2024-08-06 PROCEDURE — 6360000002 HC RX W HCPCS: Performed by: ANESTHESIOLOGY

## 2024-08-06 PROCEDURE — 7220000101 HC DELIVERY VAGINAL/SINGLE

## 2024-08-06 PROCEDURE — 1120000000 HC RM PRIVATE OB

## 2024-08-06 PROCEDURE — 00HU33Z INSERTION OF INFUSION DEVICE INTO SPINAL CANAL, PERCUTANEOUS APPROACH: ICD-10-PCS | Performed by: ANESTHESIOLOGY

## 2024-08-06 PROCEDURE — 6370000000 HC RX 637 (ALT 250 FOR IP): Performed by: OBSTETRICS & GYNECOLOGY

## 2024-08-06 PROCEDURE — 7210000100 HC LABOR FEE PER 1 HR

## 2024-08-06 PROCEDURE — 2580000003 HC RX 258: Performed by: STUDENT IN AN ORGANIZED HEALTH CARE EDUCATION/TRAINING PROGRAM

## 2024-08-06 PROCEDURE — 6360000002 HC RX W HCPCS: Performed by: STUDENT IN AN ORGANIZED HEALTH CARE EDUCATION/TRAINING PROGRAM

## 2024-08-06 RX ORDER — LANOLIN/MINERAL OIL
LOTION (ML) TOPICAL PRN
Status: DISCONTINUED | OUTPATIENT
Start: 2024-08-06 | End: 2024-08-06

## 2024-08-06 RX ORDER — DOCUSATE SODIUM 100 MG/1
100 CAPSULE, LIQUID FILLED ORAL 2 TIMES DAILY
Status: DISCONTINUED | OUTPATIENT
Start: 2024-08-06 | End: 2024-08-06

## 2024-08-06 RX ORDER — ONDANSETRON 2 MG/ML
4 INJECTION INTRAMUSCULAR; INTRAVENOUS EVERY 6 HOURS PRN
Status: DISCONTINUED | OUTPATIENT
Start: 2024-08-06 | End: 2024-08-07 | Stop reason: HOSPADM

## 2024-08-06 RX ORDER — SODIUM CHLORIDE 0.9 % (FLUSH) 0.9 %
5-40 SYRINGE (ML) INJECTION PRN
Status: DISCONTINUED | OUTPATIENT
Start: 2024-08-06 | End: 2024-08-06

## 2024-08-06 RX ORDER — SODIUM CHLORIDE, SODIUM LACTATE, POTASSIUM CHLORIDE, CALCIUM CHLORIDE 600; 310; 30; 20 MG/100ML; MG/100ML; MG/100ML; MG/100ML
INJECTION, SOLUTION INTRAVENOUS CONTINUOUS
Status: DISCONTINUED | OUTPATIENT
Start: 2024-08-06 | End: 2024-08-06

## 2024-08-06 RX ORDER — ONDANSETRON 4 MG/1
4 TABLET, ORALLY DISINTEGRATING ORAL EVERY 8 HOURS PRN
Status: DISCONTINUED | OUTPATIENT
Start: 2024-08-06 | End: 2024-08-06

## 2024-08-06 RX ORDER — LANOLIN/MINERAL OIL
LOTION (ML) TOPICAL
Status: DISCONTINUED | OUTPATIENT
Start: 2024-08-06 | End: 2024-08-07 | Stop reason: HOSPADM

## 2024-08-06 RX ORDER — ACETAMINOPHEN 500 MG
1000 TABLET ORAL EVERY 8 HOURS SCHEDULED
Status: DISCONTINUED | OUTPATIENT
Start: 2024-08-06 | End: 2024-08-06

## 2024-08-06 RX ORDER — BUPIVACAINE HYDROCHLORIDE 2.5 MG/ML
INJECTION, SOLUTION EPIDURAL; INFILTRATION; INTRACAUDAL PRN
Status: DISCONTINUED | OUTPATIENT
Start: 2024-08-06 | End: 2024-08-06 | Stop reason: SDUPTHER

## 2024-08-06 RX ORDER — SODIUM CHLORIDE 0.9 % (FLUSH) 0.9 %
5-40 SYRINGE (ML) INJECTION EVERY 12 HOURS SCHEDULED
Status: DISCONTINUED | OUTPATIENT
Start: 2024-08-06 | End: 2024-08-06

## 2024-08-06 RX ORDER — ONDANSETRON 4 MG/1
4 TABLET, ORALLY DISINTEGRATING ORAL EVERY 6 HOURS PRN
Status: DISCONTINUED | OUTPATIENT
Start: 2024-08-06 | End: 2024-08-07 | Stop reason: HOSPADM

## 2024-08-06 RX ORDER — SODIUM CHLORIDE 9 MG/ML
INJECTION, SOLUTION INTRAVENOUS PRN
Status: DISCONTINUED | OUTPATIENT
Start: 2024-08-06 | End: 2024-08-06

## 2024-08-06 RX ORDER — LANOLIN/MINERAL OIL
LOTION (ML) TOPICAL PRN
Status: DISCONTINUED | OUTPATIENT
Start: 2024-08-06 | End: 2024-08-07 | Stop reason: HOSPADM

## 2024-08-06 RX ORDER — CARBOPROST TROMETHAMINE 250 UG/ML
250 INJECTION, SOLUTION INTRAMUSCULAR
Status: ACTIVE | OUTPATIENT
Start: 2024-08-06 | End: 2024-08-07

## 2024-08-06 RX ORDER — OXYCODONE HYDROCHLORIDE 5 MG/1
5 TABLET ORAL EVERY 4 HOURS PRN
Status: DISCONTINUED | OUTPATIENT
Start: 2024-08-06 | End: 2024-08-06

## 2024-08-06 RX ORDER — SODIUM CHLORIDE 0.9 % (FLUSH) 0.9 %
5-40 SYRINGE (ML) INJECTION EVERY 12 HOURS SCHEDULED
Status: DISCONTINUED | OUTPATIENT
Start: 2024-08-06 | End: 2024-08-07 | Stop reason: HOSPADM

## 2024-08-06 RX ORDER — MISOPROSTOL 200 UG/1
400 TABLET ORAL PRN
Status: DISCONTINUED | OUTPATIENT
Start: 2024-08-06 | End: 2024-08-06

## 2024-08-06 RX ORDER — IBUPROFEN 400 MG/1
800 TABLET ORAL EVERY 8 HOURS SCHEDULED
Status: DISCONTINUED | OUTPATIENT
Start: 2024-08-06 | End: 2024-08-06

## 2024-08-06 RX ORDER — ACETAMINOPHEN 500 MG
1000 TABLET ORAL EVERY 8 HOURS SCHEDULED
Status: DISCONTINUED | OUTPATIENT
Start: 2024-08-06 | End: 2024-08-07 | Stop reason: HOSPADM

## 2024-08-06 RX ORDER — BUPIVACAINE HYDROCHLORIDE 2.5 MG/ML
INJECTION, SOLUTION EPIDURAL; INFILTRATION; INTRACAUDAL
Status: COMPLETED
Start: 2024-08-06 | End: 2024-08-06

## 2024-08-06 RX ORDER — DOCUSATE SODIUM 100 MG/1
100 CAPSULE, LIQUID FILLED ORAL 2 TIMES DAILY
Status: DISCONTINUED | OUTPATIENT
Start: 2024-08-06 | End: 2024-08-07 | Stop reason: HOSPADM

## 2024-08-06 RX ORDER — KETOROLAC TROMETHAMINE 30 MG/ML
30 INJECTION, SOLUTION INTRAMUSCULAR; INTRAVENOUS EVERY 6 HOURS
Status: DISCONTINUED | OUTPATIENT
Start: 2024-08-06 | End: 2024-08-06

## 2024-08-06 RX ORDER — IBUPROFEN 400 MG/1
800 TABLET ORAL EVERY 8 HOURS SCHEDULED
Status: DISCONTINUED | OUTPATIENT
Start: 2024-08-06 | End: 2024-08-07 | Stop reason: HOSPADM

## 2024-08-06 RX ORDER — OXYCODONE HYDROCHLORIDE 5 MG/1
10 TABLET ORAL EVERY 4 HOURS PRN
Status: DISCONTINUED | OUTPATIENT
Start: 2024-08-06 | End: 2024-08-06

## 2024-08-06 RX ORDER — SODIUM CHLORIDE 0.9 % (FLUSH) 0.9 %
5-40 SYRINGE (ML) INJECTION PRN
Status: DISCONTINUED | OUTPATIENT
Start: 2024-08-06 | End: 2024-08-07 | Stop reason: HOSPADM

## 2024-08-06 RX ORDER — SODIUM CHLORIDE 9 MG/ML
INJECTION, SOLUTION INTRAVENOUS PRN
Status: DISCONTINUED | OUTPATIENT
Start: 2024-08-06 | End: 2024-08-07 | Stop reason: HOSPADM

## 2024-08-06 RX ORDER — ONDANSETRON 2 MG/ML
4 INJECTION INTRAMUSCULAR; INTRAVENOUS EVERY 6 HOURS PRN
Status: DISCONTINUED | OUTPATIENT
Start: 2024-08-06 | End: 2024-08-06

## 2024-08-06 RX ORDER — METHYLERGONOVINE MALEATE 0.2 MG/ML
200 INJECTION INTRAVENOUS PRN
Status: DISCONTINUED | OUTPATIENT
Start: 2024-08-06 | End: 2024-08-07 | Stop reason: HOSPADM

## 2024-08-06 RX ORDER — IBUPROFEN 400 MG/1
800 TABLET ORAL EVERY 8 HOURS
Status: DISCONTINUED | OUTPATIENT
Start: 2024-08-07 | End: 2024-08-06

## 2024-08-06 RX ADMIN — TERBUTALINE SULFATE 0.25 MG: 1 INJECTION, SOLUTION SUBCUTANEOUS at 07:00

## 2024-08-06 RX ADMIN — BUPIVACAINE HYDROCHLORIDE 5 MG: 2.5 INJECTION, SOLUTION EPIDURAL; INFILTRATION; INTRACAUDAL; PERINEURAL at 07:20

## 2024-08-06 RX ADMIN — IBUPROFEN 800 MG: 400 TABLET, FILM COATED ORAL at 22:44

## 2024-08-06 RX ADMIN — Medication 166.7 ML: at 07:22

## 2024-08-06 RX ADMIN — Medication 25 MCG: at 05:33

## 2024-08-06 RX ADMIN — Medication 25 MCG: at 01:30

## 2024-08-06 RX ADMIN — BUPIVACAINE HYDROCHLORIDE 3 MG: 2.5 INJECTION, SOLUTION EPIDURAL; INFILTRATION; INTRACAUDAL; PERINEURAL at 07:19

## 2024-08-06 RX ADMIN — IBUPROFEN 800 MG: 400 TABLET, FILM COATED ORAL at 14:44

## 2024-08-06 RX ADMIN — SODIUM CHLORIDE, POTASSIUM CHLORIDE, SODIUM LACTATE AND CALCIUM CHLORIDE 999 ML/HR: 600; 310; 30; 20 INJECTION, SOLUTION INTRAVENOUS at 06:33

## 2024-08-06 RX ADMIN — DOCUSATE SODIUM 100 MG: 100 CAPSULE, LIQUID FILLED ORAL at 21:59

## 2024-08-06 RX ADMIN — MISOPROSTOL 400 MCG: 200 TABLET ORAL at 07:26

## 2024-08-06 ASSESSMENT — PAIN DESCRIPTION - DESCRIPTORS
DESCRIPTORS: CRAMPING
DESCRIPTORS: ACHING

## 2024-08-06 ASSESSMENT — PAIN DESCRIPTION - LOCATION
LOCATION: ABDOMEN
LOCATION: BACK
LOCATION: BACK

## 2024-08-06 ASSESSMENT — PAIN SCALES - GENERAL
PAINLEVEL_OUTOF10: 3
PAINLEVEL_OUTOF10: 3
PAINLEVEL_OUTOF10: 5

## 2024-08-06 ASSESSMENT — PAIN DESCRIPTION - ORIENTATION
ORIENTATION: LOWER;MID
ORIENTATION: LOWER

## 2024-08-06 NOTE — L&D DELIVERY NOTE
Delivery Procedure Note    2024    Delivery Physician:  Zeeshan Collins MD    Procedure: Spontaneous vaginal delivery    Anesthesia: Epidural    Monitor:  Fetal Heart Tones-External and Uterine Contractions- External    Estimated Blood Loss: 200    Episiotomy: Episiotomy: None    Laceration(s):  None    Laceration(s) repair: No    Suture used for repair: None    Fetal Description: obrien    Fetal Position: Left Occiput Anterior     Interventions: Nasopharyngeal/Oropharyngeal Suctioning, Warming/Drying, and Monitoring vital signs    Birth:   Baby Weight:   Information for the patient's :  JOSEFA Lua Nadya [[id number]]   Birth Weight: N/A     Baby Apgar 1 min:   Information for the patient's :  JOSEFA Lua Nadya [[id number]]   APGAR One: 8     Baby Apgar 5 min:   Information for the patient's :  JOSEFA Lua Nadya [[id number]]   APGAR Five: 9     Baby :   Information for the patient's :  JOSEFA Lua Nadya [[id number]]   2024     Baby Type of Delivery:   Information for the patient's :  JOSEFA Lua Nadya [[id number]]   Delivery Method: N/A     Baby Gender:   Information for the patient's :  JOSEFA Lua Nadya [[id number]]   female     Umbilical Cord: Nuchal Cord x  1 and 3 vessels present    Placenta Removal: expressed    Placenta Appearance: normal intact    Specimens: None           Complications:none      Signed By:  Zeeshan Collins MD     2024

## 2024-08-06 NOTE — ANESTHESIA PROCEDURE NOTES
Epidural Block    Patient location during procedure: OB  Start time: 8/6/2024 7:15 AM  End time: 8/6/2024 7:20 AM  Reason for block: labor epidural and post-op pain management  Staffing  Performed: anesthesiologist   Anesthesiologist: Alonzo Whitley MD  Performed by: Alonzo Whitley MD  Authorized by: Alonzo Whitley MD    Epidural  Patient position: sitting  Prep: DuraPrep  Patient monitoring: frequent blood pressure checks and continuous pulse ox  Approach: midline  Location: L3-4  Injection technique: MARISOL air  Provider prep: mask and sterile gloves  Needle  Needle type: MiaSolÃ©   Needle gauge: 17 G  Needle length: 3.5 in  Needle insertion depth: 6 cm  Catheter type: end hole  Catheter size: 19 G  Catheter at skin depth: 10 cm  Test dose: negativeCatheter Secured: tegaderm and tape  Assessment  Hemodynamics: stable  Attempts: 1  Outcomes: uncomplicated  Preanesthetic Checklist  Completed: patient identified, IV checked, site marked, risks and benefits discussed, surgical/procedural consents, equipment checked, pre-op evaluation, timeout performed, anesthesia consent given, oxygen available, monitors applied/VS acknowledged and fire risk safety assessment completed and verbalized

## 2024-08-06 NOTE — ANESTHESIA PRE PROCEDURE
08:15 AM       POC Tests: No results for input(s): \"POCGLU\", \"POCNA\", \"POCK\", \"POCCL\", \"POCBUN\", \"POCHEMO\", \"POCHCT\" in the last 72 hours.    Coags:   Lab Results   Component Value Date/Time    INR 1.0 05/11/2023 12:36 PM       HCG (If Applicable): No results found for: \"PREGTESTUR\", \"PREGSERUM\", \"HCG\", \"HCGQUANT\"     ABGs: No results found for: \"PHART\", \"PO2ART\", \"QYY5JNI\", \"NAU8FFY\", \"BEART\", \"G5VMWDXM\"     Type & Screen (If Applicable):  No results found for: \"LABABO\"    Drug/Infectious Status (If Applicable):  No results found for: \"HIV\", \"HEPCAB\"    COVID-19 Screening (If Applicable):   Lab Results   Component Value Date/Time    COVID19 Performed 06/01/2021 12:00 AM    COVID19 Not Detected 06/01/2021 12:00 AM           Anesthesia Evaluation  Patient summary reviewed  Airway: Mallampati: II  TM distance: >3 FB   Neck ROM: full  Mouth opening: > = 3 FB   Dental: normal exam         Pulmonary:Negative Pulmonary ROS and normal exam                               Cardiovascular:  Exercise tolerance: good (>4 METS)  (+) hypertension:        Rhythm: regular  Rate: normal                    Neuro/Psych:   (+) psychiatric history: stable with treatment            GI/Hepatic/Renal: Neg GI/Hepatic/Renal ROS            Endo/Other: Negative Endo/Other ROS                    Abdominal:             Vascular: negative vascular ROS.         Other Findings:       Anesthesia Plan      epidural     ASA 2             Anesthetic plan and risks discussed with patient.              Post-op pain plan if not by surgeon: continuous epidural        Alonzo Whitley MD   8/6/2024

## 2024-08-06 NOTE — ANESTHESIA POSTPROCEDURE EVALUATION
Department of Anesthesiology  Postprocedure Note    Patient: Nadya Lua  MRN: [id number]  YOB: 1995  Date of evaluation: 8/6/2024    Procedure Summary       Date: 08/06/24 Room / Location:     Anesthesia Start: 0713 Anesthesia Stop:     Procedure: Labor Analgesia Diagnosis:     Scheduled Providers:  Responsible Provider: Alonzo Whitley MD    Anesthesia Type: epidural ASA Status: 2            Anesthesia Type: No value filed.    Los Phase I:      Los Phase II:      Anesthesia Post Evaluation    No notable events documented.

## 2024-08-07 VITALS
TEMPERATURE: 97.7 F | BODY MASS INDEX: 33.46 KG/M2 | SYSTOLIC BLOOD PRESSURE: 132 MMHG | RESPIRATION RATE: 16 BRPM | WEIGHT: 196 LBS | HEIGHT: 64 IN | HEART RATE: 74 BPM | OXYGEN SATURATION: 96 % | DIASTOLIC BLOOD PRESSURE: 84 MMHG

## 2024-08-07 LAB
ERYTHROCYTE [DISTWIDTH] IN BLOOD BY AUTOMATED COUNT: 12.6 % (ref 11.5–14.5)
HCT VFR BLD AUTO: 32.1 % (ref 35–47)
HGB BLD-MCNC: 10.9 G/DL (ref 11.5–16)
MCH RBC QN AUTO: 31.3 PG (ref 26–34)
MCHC RBC AUTO-ENTMCNC: 34 G/DL (ref 30–36.5)
MCV RBC AUTO: 92.2 FL (ref 80–99)
NRBC # BLD: 0 K/UL (ref 0–0.01)
NRBC BLD-RTO: 0 PER 100 WBC
PLATELET # BLD AUTO: 174 K/UL (ref 150–400)
PMV BLD AUTO: 11 FL (ref 8.9–12.9)
RBC # BLD AUTO: 3.48 M/UL (ref 3.8–5.2)
WBC # BLD AUTO: 9 K/UL (ref 3.6–11)

## 2024-08-07 PROCEDURE — 85027 COMPLETE CBC AUTOMATED: CPT

## 2024-08-07 PROCEDURE — 36415 COLL VENOUS BLD VENIPUNCTURE: CPT

## 2024-08-07 PROCEDURE — 6370000000 HC RX 637 (ALT 250 FOR IP): Performed by: STUDENT IN AN ORGANIZED HEALTH CARE EDUCATION/TRAINING PROGRAM

## 2024-08-07 PROCEDURE — 90471 IMMUNIZATION ADMIN: CPT | Performed by: STUDENT IN AN ORGANIZED HEALTH CARE EDUCATION/TRAINING PROGRAM

## 2024-08-07 PROCEDURE — 90707 MMR VACCINE SC: CPT | Performed by: STUDENT IN AN ORGANIZED HEALTH CARE EDUCATION/TRAINING PROGRAM

## 2024-08-07 PROCEDURE — 6370000000 HC RX 637 (ALT 250 FOR IP): Performed by: OBSTETRICS & GYNECOLOGY

## 2024-08-07 PROCEDURE — 6360000002 HC RX W HCPCS: Performed by: STUDENT IN AN ORGANIZED HEALTH CARE EDUCATION/TRAINING PROGRAM

## 2024-08-07 RX ORDER — PSEUDOEPHEDRINE HCL 30 MG
100 TABLET ORAL 2 TIMES DAILY
Qty: 20 CAPSULE | Refills: 0 | Status: SHIPPED | OUTPATIENT
Start: 2024-08-07 | End: 2024-08-17

## 2024-08-07 RX ORDER — IBUPROFEN 800 MG/1
800 TABLET ORAL EVERY 8 HOURS SCHEDULED
Qty: 30 TABLET | Refills: 0 | Status: SHIPPED | OUTPATIENT
Start: 2024-08-07 | End: 2024-08-17

## 2024-08-07 RX ADMIN — IBUPROFEN 800 MG: 400 TABLET, FILM COATED ORAL at 10:00

## 2024-08-07 RX ADMIN — ACETAMINOPHEN 1000 MG: 500 TABLET ORAL at 05:14

## 2024-08-07 RX ADMIN — MEASLES, MUMPS, AND RUBELLA VIRUS VACCINE LIVE 0.5 ML: 1000; 12500; 1000 INJECTION, POWDER, LYOPHILIZED, FOR SUSPENSION SUBCUTANEOUS at 12:28

## 2024-08-07 ASSESSMENT — PAIN SCALES - GENERAL
PAINLEVEL_OUTOF10: 2
PAINLEVEL_OUTOF10: 2
PAINLEVEL_OUTOF10: 0
PAINLEVEL_OUTOF10: 4
PAINLEVEL_OUTOF10: 0

## 2024-08-07 ASSESSMENT — PAIN DESCRIPTION - DESCRIPTORS
DESCRIPTORS: CRAMPING
DESCRIPTORS: CRAMPING

## 2024-08-07 ASSESSMENT — PAIN DESCRIPTION - PAIN TYPE
TYPE: ACUTE PAIN
TYPE: ACUTE PAIN

## 2024-08-07 ASSESSMENT — PAIN DESCRIPTION - LOCATION
LOCATION: ABDOMEN
LOCATION: ABDOMEN

## 2024-08-07 ASSESSMENT — PAIN SCALES - WONG BAKER
WONGBAKER_NUMERICALRESPONSE: HURTS A LITTLE BIT
WONGBAKER_NUMERICALRESPONSE: NO HURT

## 2024-08-07 ASSESSMENT — PAIN DESCRIPTION - ORIENTATION: ORIENTATION: LOWER

## 2024-08-07 ASSESSMENT — PAIN DESCRIPTION - FREQUENCY
FREQUENCY: INTERMITTENT
FREQUENCY: INTERMITTENT

## 2024-08-07 NOTE — DISCHARGE SUMMARY
care    Condition at Discharge: Stable    Reference my discharge instructions.    No follow-ups on file.     Signed By:  Dalila Arroyo DO     August 7, 2024

## 2024-08-07 NOTE — PROGRESS NOTES
0707 - Pt sitting up for epidural    0718 - Pt complete, RN at bedside continuously monitoring strip.    0719 - Live BG delivered vaginally by Dr. Collins    
0720: Bedside shift change report given to NUNU Owens RN (oncoming nurse) by NEIL Sparks (offgoing nurse). Report included the following information Nurse Handoff Report.     1255: Pt discharge with family and infant, d/c instructions reviewed and all questions answered, pt ambulated off unit in no acute distress.  
1534: Nadya Lua is a 28 y.o.  at 37w1d patient of Dr Beltran at [facility] who presents to L&D for scheduled IOL for IUGR. She reports Positive FM, denies vaginal bleeding, LOF, and contractions. She also denies Headaches, Scotoma, RUQ pain, and Edema. Urine sample obtained. EFM and toco placed for initial assessment.      1704:Dr. Lawson at bedside for evaluation. SVE and US performed by MD. MD stated ok for pt to eat dinner and to come off of continuous monitor. MD reviewed fetal tracing   
7:30 AM  Received report from BILLY Tony RN and assumed care.     7:45 AM  Care turned over to DILSHAD Badillo RN as this RN must be 1:1 pushing with another pt. Full assessment not completed by this RN.  
Platelets 174 150 - 400 K/uL    MPV 11.0 8.9 - 12.9 FL    Nucleated RBCs 0.0 0  WBC    nRBC 0.00 0.00 - 0.01 K/uL

## 2024-08-07 NOTE — DISCHARGE INSTRUCTIONS
POST DELIVERY DISCHARGE INSTRUCTIONS    Name: Nadya Lua  YOB: 1995  Primary Diagnosis: [unfilled]    General:     Diet/Diet Restrictions:  Eight 8-ounce glasses of fluid daily (water, juices); avoid excessive caffeine intake.  Meals/snacks as desired which are high in fiber and carbohydrates and low in fat and cholesterol.    Medications:   {Medication reconciliation information is now added to the patient's AVS automatically when it is printed.  There is no need to use this SmartLink in discharge instructions.  Highlight this text and delete it to clear this message}      Physical Activity / Restrictions / Safety:     Avoid heavy lifting, no more that 8 lbs. For 2-3 weeks;   Limit use of stairs to 2 times daily for the first week home.   No driving for one week.  Avoid intercourse 4-6 weeks, no douching or tampon use.   Check with obstetrician before starting or resuming an exercise program.      Discharge Instructions/Special Treatment/Home Care Needs:     Continue prenatal vitamins.  Continue to use squirt bottle with warm water on your episiotomy after each bathroom use until bleeding stops.  If steri-strips applied to your incision, remove in 7-10 days.    Call your doctor for the following:     Fever over 101 degrees by mouth.  Vaginal bleeding heavier than a normal menstrual period or clots larger than a golf ball.  Red streaks or increased swelling of legs, painful red streaks on your breast.  Painful urination, constipation and increased pain or swelling or discharge with your incision.  If you feel extremely anxious or overwhelmed.  If you have thoughts of harming yourself and/or your baby.    Pain Management:     Take Acetaminophen (Tylenol) or Ibuprofen (Advil, Motrin), as directed for pain.   Use a warm Sitz bath 3 times daily to relieve episiotomy or hemorrhoidal discomfort.   For hemorrhoidal discomfort, use Tucks and Anusol cream as needed and directed.  Heating pad to

## 2025-04-04 ENCOUNTER — OFFICE VISIT (OUTPATIENT)
Age: 30
End: 2025-04-04
Payer: COMMERCIAL

## 2025-04-04 VITALS
TEMPERATURE: 100.3 F | HEART RATE: 109 BPM | HEIGHT: 64 IN | RESPIRATION RATE: 20 BRPM | DIASTOLIC BLOOD PRESSURE: 60 MMHG | BODY MASS INDEX: 30.83 KG/M2 | SYSTOLIC BLOOD PRESSURE: 120 MMHG | OXYGEN SATURATION: 96 % | WEIGHT: 180.6 LBS

## 2025-04-04 DIAGNOSIS — H66.90 ACUTE OTITIS MEDIA, UNSPECIFIED OTITIS MEDIA TYPE: Primary | ICD-10-CM

## 2025-04-04 PROCEDURE — 3074F SYST BP LT 130 MM HG: CPT | Performed by: NURSE PRACTITIONER

## 2025-04-04 PROCEDURE — 3078F DIAST BP <80 MM HG: CPT | Performed by: NURSE PRACTITIONER

## 2025-04-04 PROCEDURE — 96372 THER/PROPH/DIAG INJ SC/IM: CPT | Performed by: NURSE PRACTITIONER

## 2025-04-04 PROCEDURE — 99214 OFFICE O/P EST MOD 30 MIN: CPT | Performed by: NURSE PRACTITIONER

## 2025-04-04 RX ORDER — CEFTRIAXONE 1 G/1
1000 INJECTION, POWDER, FOR SOLUTION INTRAMUSCULAR; INTRAVENOUS ONCE
Status: COMPLETED | OUTPATIENT
Start: 2025-04-04 | End: 2025-04-04

## 2025-04-04 RX ORDER — CEFDINIR 300 MG/1
300 CAPSULE ORAL 2 TIMES DAILY
Qty: 14 CAPSULE | Refills: 0 | Status: SHIPPED | OUTPATIENT
Start: 2025-04-04 | End: 2025-04-11

## 2025-04-04 RX ADMIN — CEFTRIAXONE 1000 MG: 1 INJECTION, POWDER, FOR SOLUTION INTRAMUSCULAR; INTRAVENOUS at 12:07

## 2025-04-04 ASSESSMENT — PATIENT HEALTH QUESTIONNAIRE - PHQ9
2. FEELING DOWN, DEPRESSED OR HOPELESS: NOT AT ALL
SUM OF ALL RESPONSES TO PHQ QUESTIONS 1-9: 0
1. LITTLE INTEREST OR PLEASURE IN DOING THINGS: NOT AT ALL
SUM OF ALL RESPONSES TO PHQ QUESTIONS 1-9: 0

## 2025-04-04 NOTE — PROGRESS NOTES
Chief Complaint   Patient presents with    Ear Pain     L ear pain       ASSESSMENT AND PLAN:       1. Acute otitis media, unspecified otitis media type    - cefTRIAXone (ROCEPHIN) injection 1,000 mg  - cefdinir (OMNICEF) 300 MG capsule; Take 1 capsule by mouth 2 times daily for 7 days  Dispense: 14 capsule; Refill: 0     Tx with Rocephin IM and PO abx. L cervical lymphadenopathy is dramatic. I want to re-check her Tuesday if any symptoms are lingering.     Patient aware of plan of care and verbalized understanding. Questions answered. RTC Tuesday, or sooner if needed.    HPI:     is a 29 y.o. female in today for an acute visit with a CC of L sided ear pain that started last month, took some leftover abx without full resolution. Over the past few days, she's felt worsening L ear pain, fever, and a sore throat. This morning, the sore throat has resolved but the L side of her neck is swollen.     No Known Allergies    Prior to Visit Medications    Medication Sig Taking? Authorizing Provider   cefdinir (OMNICEF) 300 MG capsule Take 1 capsule by mouth 2 times daily for 7 days Yes Arti Ballesteros, APRN - NP   ibuprofen (ADVIL;MOTRIN) 800 MG tablet Take 1 tablet by mouth every 8 hours for 10 days  Dalila Arroyo DO       Past Medical History:   Diagnosis Date    Essential hypertension     Psychiatric problem     Anxietydepression       History reviewed. No pertinent family history.      Review of Systems    A comprehensive review of systems was negative except for that written in the HPI.    Patient is not experiencing chest pain radiating to the jaw and/or down the arms.    Physical Examination:    Vitals:    04/04/25 1145   BP: 120/60   BP Site: Left Upper Arm   Patient Position: Sitting   BP Cuff Size: Medium Adult   Pulse: (!) 109   Resp: 20   Temp: 100.3 °F (37.9 °C)   TempSrc: Oral   SpO2: 96%   Weight: 81.9 kg (180 lb 9.6 oz)   Height: 1.626 m (5' 4\")      Body mass index is 31 kg/m².     Wt